# Patient Record
Sex: MALE | Race: WHITE | NOT HISPANIC OR LATINO | Employment: OTHER | URBAN - METROPOLITAN AREA
[De-identification: names, ages, dates, MRNs, and addresses within clinical notes are randomized per-mention and may not be internally consistent; named-entity substitution may affect disease eponyms.]

---

## 2023-08-26 DIAGNOSIS — E78.5 HYPERLIPIDEMIA, UNSPECIFIED HYPERLIPIDEMIA TYPE: ICD-10-CM

## 2023-08-28 RX ORDER — ROSUVASTATIN CALCIUM 40 MG/1
40 TABLET, COATED ORAL DAILY
Qty: 30 TABLET | Refills: 0 | Status: SHIPPED | OUTPATIENT
Start: 2023-08-28 | End: 2023-09-19 | Stop reason: SDUPTHER

## 2023-09-19 ENCOUNTER — OFFICE VISIT (OUTPATIENT)
Dept: PRIMARY CARE | Facility: CLINIC | Age: 68
End: 2023-09-19
Payer: MEDICARE

## 2023-09-19 VITALS
DIASTOLIC BLOOD PRESSURE: 80 MMHG | WEIGHT: 177 LBS | HEART RATE: 70 BPM | SYSTOLIC BLOOD PRESSURE: 126 MMHG | BODY MASS INDEX: 25.4 KG/M2

## 2023-09-19 DIAGNOSIS — Z23 NEED FOR INFLUENZA VACCINATION: ICD-10-CM

## 2023-09-19 DIAGNOSIS — E78.5 HYPERLIPIDEMIA LDL GOAL <100: ICD-10-CM

## 2023-09-19 DIAGNOSIS — N13.8 HYPERTROPHY OF PROSTATE WITH URINARY OBSTRUCTION: ICD-10-CM

## 2023-09-19 DIAGNOSIS — N40.1 HYPERTROPHY OF PROSTATE WITH URINARY OBSTRUCTION: ICD-10-CM

## 2023-09-19 DIAGNOSIS — Z00.00 MEDICARE ANNUAL WELLNESS VISIT, SUBSEQUENT: Primary | ICD-10-CM

## 2023-09-19 DIAGNOSIS — Z00.00 HEALTHCARE MAINTENANCE: ICD-10-CM

## 2023-09-19 DIAGNOSIS — Z12.5 PROSTATE CANCER SCREENING: ICD-10-CM

## 2023-09-19 DIAGNOSIS — I10 BENIGN ESSENTIAL HYPERTENSION: ICD-10-CM

## 2023-09-19 PROBLEM — M25.511 RIGHT ANTERIOR SHOULDER PAIN: Status: ACTIVE | Noted: 2023-09-19

## 2023-09-19 PROBLEM — R94.4 DECREASED GFR: Status: ACTIVE | Noted: 2023-09-19

## 2023-09-19 PROBLEM — M79.89 LEFT LEG SWELLING: Status: ACTIVE | Noted: 2023-09-19

## 2023-09-19 PROBLEM — M79.89 LEFT LEG SWELLING: Status: RESOLVED | Noted: 2023-09-19 | Resolved: 2023-09-19

## 2023-09-19 PROBLEM — M19.141 POST-TRAUMATIC OSTEOARTHRITIS OF RIGHT HAND: Status: ACTIVE | Noted: 2023-09-19

## 2023-09-19 PROBLEM — M21.40 FLAT FOOT: Status: ACTIVE | Noted: 2023-09-19

## 2023-09-19 PROBLEM — M25.562 CHRONIC PAIN OF LEFT KNEE: Status: ACTIVE | Noted: 2023-09-19

## 2023-09-19 PROBLEM — G89.29 CHRONIC PAIN OF LEFT KNEE: Status: ACTIVE | Noted: 2023-09-19

## 2023-09-19 PROBLEM — R55 SYNCOPE: Status: ACTIVE | Noted: 2023-09-19

## 2023-09-19 PROBLEM — M21.40 FLAT FOOT: Status: RESOLVED | Noted: 2023-09-19 | Resolved: 2023-09-19

## 2023-09-19 PROBLEM — I25.10 CORONARY ARTERY CALCIFICATION SEEN ON CT SCAN: Status: ACTIVE | Noted: 2023-09-19

## 2023-09-19 PROBLEM — R55 SYNCOPE: Status: RESOLVED | Noted: 2023-09-19 | Resolved: 2023-09-19

## 2023-09-19 PROBLEM — M75.121 COMPLETE TEAR OF RIGHT ROTATOR CUFF: Status: ACTIVE | Noted: 2023-09-19

## 2023-09-19 PROBLEM — I25.10 ATHEROSCLEROTIC HEART DISEASE OF NATIVE CORONARY ARTERY WITHOUT ANGINA PECTORIS: Status: ACTIVE | Noted: 2023-09-19

## 2023-09-19 PROBLEM — R00.1 SINUS BRADYCARDIA: Status: ACTIVE | Noted: 2023-09-19

## 2023-09-19 PROBLEM — M17.12 PRIMARY OSTEOARTHRITIS OF LEFT KNEE: Status: ACTIVE | Noted: 2023-09-19

## 2023-09-19 PROCEDURE — G0439 PPPS, SUBSEQ VISIT: HCPCS | Performed by: FAMILY MEDICINE

## 2023-09-19 PROCEDURE — 99214 OFFICE O/P EST MOD 30 MIN: CPT | Performed by: FAMILY MEDICINE

## 2023-09-19 PROCEDURE — 1170F FXNL STATUS ASSESSED: CPT | Performed by: FAMILY MEDICINE

## 2023-09-19 PROCEDURE — 3079F DIAST BP 80-89 MM HG: CPT | Performed by: FAMILY MEDICINE

## 2023-09-19 PROCEDURE — 1159F MED LIST DOCD IN RCRD: CPT | Performed by: FAMILY MEDICINE

## 2023-09-19 PROCEDURE — 1160F RVW MEDS BY RX/DR IN RCRD: CPT | Performed by: FAMILY MEDICINE

## 2023-09-19 PROCEDURE — 99397 PER PM REEVAL EST PAT 65+ YR: CPT | Performed by: FAMILY MEDICINE

## 2023-09-19 PROCEDURE — 3074F SYST BP LT 130 MM HG: CPT | Performed by: FAMILY MEDICINE

## 2023-09-19 PROCEDURE — G0008 ADMIN INFLUENZA VIRUS VAC: HCPCS | Performed by: FAMILY MEDICINE

## 2023-09-19 PROCEDURE — 90662 IIV NO PRSV INCREASED AG IM: CPT | Performed by: FAMILY MEDICINE

## 2023-09-19 PROCEDURE — 1036F TOBACCO NON-USER: CPT | Performed by: FAMILY MEDICINE

## 2023-09-19 RX ORDER — CELECOXIB 200 MG/1
CAPSULE ORAL
COMMUNITY
Start: 2020-07-10 | End: 2023-09-19 | Stop reason: ALTCHOICE

## 2023-09-19 RX ORDER — OLMESARTAN MEDOXOMIL 5 MG/1
1 TABLET ORAL DAILY
COMMUNITY
Start: 2020-12-21 | End: 2023-09-19 | Stop reason: SINTOL

## 2023-09-19 RX ORDER — TAMSULOSIN HYDROCHLORIDE 0.4 MG/1
0.8 CAPSULE ORAL DAILY
Qty: 180 CAPSULE | Refills: 1 | Status: SHIPPED | OUTPATIENT
Start: 2023-09-19 | End: 2024-03-14

## 2023-09-19 RX ORDER — ROSUVASTATIN CALCIUM 40 MG/1
40 TABLET, COATED ORAL DAILY
Qty: 90 TABLET | Refills: 1 | Status: SHIPPED | OUTPATIENT
Start: 2023-09-19 | End: 2024-03-07 | Stop reason: SDUPTHER

## 2023-09-19 RX ORDER — TAMSULOSIN HYDROCHLORIDE 0.4 MG/1
0.8 CAPSULE ORAL DAILY
COMMUNITY
End: 2023-09-19 | Stop reason: SDUPTHER

## 2023-09-19 ASSESSMENT — ACTIVITIES OF DAILY LIVING (ADL)
TAKING_MEDICATION: INDEPENDENT
DRESSING: INDEPENDENT
DOING_HOUSEWORK: INDEPENDENT
MANAGING_FINANCES: INDEPENDENT
GROCERY_SHOPPING: INDEPENDENT
BATHING: INDEPENDENT

## 2023-09-19 ASSESSMENT — PATIENT HEALTH QUESTIONNAIRE - PHQ9
1. LITTLE INTEREST OR PLEASURE IN DOING THINGS: NOT AT ALL
2. FEELING DOWN, DEPRESSED OR HOPELESS: NOT AT ALL
SUM OF ALL RESPONSES TO PHQ9 QUESTIONS 1 AND 2: 0

## 2023-09-19 ASSESSMENT — ENCOUNTER SYMPTOMS
OCCASIONAL FEELINGS OF UNSTEADINESS: 0
DEPRESSION: 0
LOSS OF SENSATION IN FEET: 0

## 2023-09-19 NOTE — ASSESSMENT & PLAN NOTE
All questions were answered and you were counseled on immunization(s) in detail and vaccination was provided

## 2023-09-19 NOTE — PROGRESS NOTES
Subjective   Reason for Visit: Brock Chatman is an 68 y.o. male here for a Medicare Annual Wellness Visit Subsequent, Hyperlipidemia, and Annual Exam.     Past Medical, Surgical, and Family History reviewed and updated in chart.    Reviewed all medications by prescribing practitioner or clinical pharmacist (such as prescriptions, OTCs, herbal therapies and supplements) and documented in the medical record.    HPI  1.  Medicare wellness/physical exam.  Colonoscopy 2017 with 10-year clearance  Vaccines up-to-date:  Tdap 2017  Shingrix 2020  PPSV23 2022  Would like influenza today    2.  Hyperlipidemia.  Taking Rosuvastatin 40 mg as prescribed   Tolerating well without side effects   Last lipid panel (Feb 2023) was well controlled   Requesting refill today     3.  BPH.  Taking tamsulosin 0.4 mg twice daily. Tolerating well.  States this continues to work well for symptoms, notices a difference in urinary symptoms if he forgets to take a dose   Denies any significant nocturia, not wanting to consider urology consult right now for any type of procedure    4.  Hypertension.  Previously taking olmesartan 20 mg daily but discontinued by cardiology after having dizziness symptoms and after a good stress echo  BP today was 126/80    Review of Systems  All pertinent positive symptoms are included in the history of present illness.    All other systems have been reviewed and are negative and noncontributory to this patient's current ailments.    Current Outpatient Medications   Medication Instructions    rosuvastatin (CRESTOR) 40 mg, oral, Daily    tamsulosin (FLOMAX) 0.8 mg, oral, Daily     Allergies   Allergen Reactions    Ace Inhibitors Cough     Immunization History   Administered Date(s) Administered    Flu vaccine (IIV4), preservative free *Check age/dose* 10/16/2016, 11/10/2018, 10/03/2020    Flu vaccine, quadrivalent, high-dose, preservative free, age 65y+ (FLUZONE) 11/02/2021, 10/05/2022, 09/19/2023    Influenza,  injectable, MDCK, preservative free, quadrivalent 10/13/2019    Influenza, seasonal, injectable 10/26/2013, 10/12/2014    Influenza, seasonal, injectable, preservative free 10/17/2015, 11/06/2017    Pfizer COVID-19 vaccine, bivalent, age 12 years and older (30 mcg/0.3 mL) 11/11/2022    Pfizer Purple Cap SARS-CoV-2 04/24/2021, 05/15/2021, 01/06/2022    Pneumococcal polysaccharide vaccine, 23-valent, age 2 years and older (PNEUMOVAX 23) 06/20/2022    Tdap vaccine, age 7 year and older (BOOSTRIX) 03/01/2017    Zoster vaccine, recombinant, adult (SHINGRIX) 12/28/2019, 07/12/2020     Past Surgical History:   Procedure Laterality Date    COLONOSCOPY  08/28/2014    Complete Colonoscopy    HEMORRHOID SURGERY  08/27/2014    Hemorrhoidectomy    HERNIA REPAIR  08/28/2014    Hernia Repair    OTHER SURGICAL HISTORY  08/28/2014    Wrist Surgery    ROTATOR CUFF REPAIR  08/28/2014    Rotator Cuff Repair    TONSILLECTOMY  08/27/2014    Tonsillectomy     No family history on file.    Social History     Tobacco Use    Smoking status: Never    Smokeless tobacco: Never     Patient Self Assessment of Health Status  Patient Self Assessment: Good    Nutrition and Exercise  Current Diet: Well Balanced Diet  Adequate Fluid Intake: Yes  Caffeine: Yes  Exercise Frequency: Infrequently    Functional Ability/Level of Safety  Cognitive Impairment Observed: No cognitive impairment observed  Cognitive Impairment Reported: No cognitive impairment reported by patient or family    Home Safety Risk Factors: None    Objective   Visit Vitals  /80   Pulse 70   Wt 80.3 kg (177 lb)   BMI 25.40 kg/m²   Smoking Status Never   BSA 1.99 m²      Physical Exam  CONSTITUTIONAL - well nourished, well developed, looks like stated age, in no acute distress, not ill-appearing, and not tired appearing  SKIN - normal skin color and pigmentation, normal skin turgor without rash, lesions, or nodules visualized  HEAD - no trauma, normocephalic  EYES - normal external  exam  CHEST - clear to auscultation, no wheezing, no crackles and no rales, good effort  CARDIAC - bradycardic rate and regular rhythm, no skipped beats, no murmur  EXTREMITIES - no edema, no deformities  NEUROLOGICAL - normal balance, normal motor, no ataxia  PSYCHIATRIC - alert, pleasant and cordial, age-appropriate     Assessment/Plan   Problem List Items Addressed This Visit       Benign essential hypertension    Current Assessment & Plan     I would like to have you monitor and record blood pressures at home   Blood pressure goal should be below 130/80, ideally 120/80  If the blood pressure is too high, we need to consider restarting antihypertensive therapy         Relevant Orders    Comprehensive Metabolic Panel    Hyperlipidemia LDL goal <100    Current Assessment & Plan     Please obtain fasting blood work at your earliest convenience  Continue current medication without change         Relevant Medications    rosuvastatin (Crestor) 40 mg tablet    Other Relevant Orders    Lipid Panel    Hypertrophy of prostate with urinary obstruction    Current Assessment & Plan     Stable, no changes to medication recommended  If symptoms worsen, notify me so that I can get urology consult to discuss possible procedure         Relevant Medications    tamsulosin (Flomax) 0.4 mg 24 hr capsule    Healthcare maintenance    Current Assessment & Plan     Complete history and physical examination was performed  We will notify of test results once available         Need for influenza vaccination    Current Assessment & Plan     All questions were answered and you were counseled on immunization(s) in detail and vaccination was provided         Relevant Orders    Flu vaccine, quadrivalent, high-dose, preservative free, age 65y+ (FLUZONE) (Completed)     Other Visit Diagnoses       Medicare annual wellness visit, subsequent    -  Primary    Questions asked and reviewed    Prostate cancer screening        Relevant Orders    Prostate  Specific Antigen, Screen          Current Outpatient Medications   Medication Instructions    rosuvastatin (CRESTOR) 40 mg, oral, Daily    tamsulosin (FLOMAX) 0.8 mg, oral, Daily     Patient Care Team:  Brandon Ramirez DO as PCP - General (Family Medicine)  Brandon Ramirez DO as PCP - [employer] PCP

## 2023-09-19 NOTE — ASSESSMENT & PLAN NOTE
Complete history and physical examination was performed  We will notify of test results once available

## 2023-09-19 NOTE — ASSESSMENT & PLAN NOTE
Stable, no changes to medication recommended  If symptoms worsen, notify me so that I can get urology consult to discuss possible procedure

## 2023-09-19 NOTE — ASSESSMENT & PLAN NOTE
I would like to have you monitor and record blood pressures at home   Blood pressure goal should be below 130/80, ideally 120/80  If the blood pressure is too high, we need to consider restarting antihypertensive therapy

## 2023-09-19 NOTE — ASSESSMENT & PLAN NOTE
Please obtain fasting blood work at your earliest convenience  Continue current medication without change

## 2023-09-20 ENCOUNTER — LAB (OUTPATIENT)
Dept: LAB | Facility: LAB | Age: 68
End: 2023-09-20
Payer: MEDICARE

## 2023-09-20 DIAGNOSIS — Z12.5 PROSTATE CANCER SCREENING: ICD-10-CM

## 2023-09-20 DIAGNOSIS — E78.5 HYPERLIPIDEMIA LDL GOAL <100: ICD-10-CM

## 2023-09-20 DIAGNOSIS — I10 BENIGN ESSENTIAL HYPERTENSION: ICD-10-CM

## 2023-09-20 LAB
ALANINE AMINOTRANSFERASE (SGPT) (U/L) IN SER/PLAS: 13 U/L (ref 10–52)
ALBUMIN (G/DL) IN SER/PLAS: 3.9 G/DL (ref 3.4–5)
ALKALINE PHOSPHATASE (U/L) IN SER/PLAS: 68 U/L (ref 33–136)
ANION GAP IN SER/PLAS: 8 MMOL/L (ref 10–20)
ASPARTATE AMINOTRANSFERASE (SGOT) (U/L) IN SER/PLAS: 15 U/L (ref 9–39)
BILIRUBIN TOTAL (MG/DL) IN SER/PLAS: 0.6 MG/DL (ref 0–1.2)
CALCIUM (MG/DL) IN SER/PLAS: 8.8 MG/DL (ref 8.6–10.3)
CARBON DIOXIDE, TOTAL (MMOL/L) IN SER/PLAS: 30 MMOL/L (ref 21–32)
CHLORIDE (MMOL/L) IN SER/PLAS: 109 MMOL/L (ref 98–107)
CHOLESTEROL (MG/DL) IN SER/PLAS: 195 MG/DL (ref 0–199)
CHOLESTEROL IN HDL (MG/DL) IN SER/PLAS: 50.9 MG/DL
CHOLESTEROL/HDL RATIO: 3.8
CREATININE (MG/DL) IN SER/PLAS: 1.05 MG/DL (ref 0.5–1.3)
GFR MALE: 78 ML/MIN/1.73M2
GLUCOSE (MG/DL) IN SER/PLAS: 91 MG/DL (ref 74–99)
LDL: 126 MG/DL (ref 0–99)
POTASSIUM (MMOL/L) IN SER/PLAS: 4.7 MMOL/L (ref 3.5–5.3)
PROSTATE SPECIFIC ANTIGEN,SCREEN: 3.76 NG/ML (ref 0–4)
PROTEIN TOTAL: 5.9 G/DL (ref 6.4–8.2)
SODIUM (MMOL/L) IN SER/PLAS: 142 MMOL/L (ref 136–145)
TRIGLYCERIDE (MG/DL) IN SER/PLAS: 91 MG/DL (ref 0–149)
UREA NITROGEN (MG/DL) IN SER/PLAS: 13 MG/DL (ref 6–23)
VLDL: 18 MG/DL (ref 0–40)

## 2023-09-20 PROCEDURE — 36415 COLL VENOUS BLD VENIPUNCTURE: CPT

## 2023-09-20 PROCEDURE — 80061 LIPID PANEL: CPT

## 2023-09-20 PROCEDURE — G0103 PSA SCREENING: HCPCS

## 2023-09-20 PROCEDURE — 80053 COMPREHEN METABOLIC PANEL: CPT

## 2023-09-22 DIAGNOSIS — R97.20 INCREASED PROSTATE SPECIFIC ANTIGEN (PSA) VELOCITY: Primary | ICD-10-CM

## 2023-09-22 NOTE — RESULT ENCOUNTER NOTE
Cholesterol 195, 50, 126, 91 previously 148, 65, 66, 81.  Not sure why the bad cholesterol went up by 60 points, but possibly noncompliance?  At any rate, we need to continue rosuvastatin 40 mg daily  Sugar, kidney, liver, normal  Prostate cancer blood test is at 3.76 previous 2.71 so it has increased by 1.05 in the past year which can potentially be indicative of pathology such as prostate cancer.  I would like to have you repeat the PSA in about 2 months and if it continues to increase then urology consult would be warranted.  If not, then it is likely secondary to prostate enlargement

## 2024-03-05 ENCOUNTER — HOSPITAL ENCOUNTER (OUTPATIENT)
Dept: RADIOLOGY | Facility: CLINIC | Age: 69
Discharge: HOME | End: 2024-03-05
Payer: MEDICARE

## 2024-03-05 ENCOUNTER — OFFICE VISIT (OUTPATIENT)
Dept: PRIMARY CARE | Facility: CLINIC | Age: 69
End: 2024-03-05
Payer: MEDICARE

## 2024-03-05 VITALS
DIASTOLIC BLOOD PRESSURE: 80 MMHG | SYSTOLIC BLOOD PRESSURE: 136 MMHG | HEART RATE: 76 BPM | HEIGHT: 70 IN | BODY MASS INDEX: 26.2 KG/M2 | WEIGHT: 183 LBS

## 2024-03-05 DIAGNOSIS — E78.5 HYPERLIPIDEMIA LDL GOAL <100: Primary | ICD-10-CM

## 2024-03-05 DIAGNOSIS — M62.830 LUMBAR PARASPINAL MUSCLE SPASM: ICD-10-CM

## 2024-03-05 DIAGNOSIS — R97.20 INCREASED PROSTATE SPECIFIC ANTIGEN (PSA) VELOCITY: ICD-10-CM

## 2024-03-05 DIAGNOSIS — G89.29 CHRONIC RIGHT-SIDED LOW BACK PAIN WITHOUT SCIATICA: ICD-10-CM

## 2024-03-05 DIAGNOSIS — M54.50 CHRONIC RIGHT-SIDED LOW BACK PAIN WITHOUT SCIATICA: ICD-10-CM

## 2024-03-05 DIAGNOSIS — M54.59 LUMBAR TRIGGER POINT SYNDROME: ICD-10-CM

## 2024-03-05 DIAGNOSIS — I10 BENIGN ESSENTIAL HYPERTENSION: ICD-10-CM

## 2024-03-05 PROCEDURE — 1160F RVW MEDS BY RX/DR IN RCRD: CPT | Performed by: FAMILY MEDICINE

## 2024-03-05 PROCEDURE — 72110 X-RAY EXAM L-2 SPINE 4/>VWS: CPT | Performed by: RADIOLOGY

## 2024-03-05 PROCEDURE — 20553 NJX 1/MLT TRIGGER POINTS 3/>: CPT | Performed by: FAMILY MEDICINE

## 2024-03-05 PROCEDURE — 3075F SYST BP GE 130 - 139MM HG: CPT | Performed by: FAMILY MEDICINE

## 2024-03-05 PROCEDURE — 1123F ACP DISCUSS/DSCN MKR DOCD: CPT | Performed by: FAMILY MEDICINE

## 2024-03-05 PROCEDURE — 1158F ADVNC CARE PLAN TLK DOCD: CPT | Performed by: FAMILY MEDICINE

## 2024-03-05 PROCEDURE — 1159F MED LIST DOCD IN RCRD: CPT | Performed by: FAMILY MEDICINE

## 2024-03-05 PROCEDURE — 72110 X-RAY EXAM L-2 SPINE 4/>VWS: CPT

## 2024-03-05 PROCEDURE — 99214 OFFICE O/P EST MOD 30 MIN: CPT | Performed by: FAMILY MEDICINE

## 2024-03-05 PROCEDURE — 1036F TOBACCO NON-USER: CPT | Performed by: FAMILY MEDICINE

## 2024-03-05 PROCEDURE — 3079F DIAST BP 80-89 MM HG: CPT | Performed by: FAMILY MEDICINE

## 2024-03-05 RX ORDER — TRIAMCINOLONE ACETONIDE 40 MG/ML
40 INJECTION, SUSPENSION INTRA-ARTICULAR; INTRAMUSCULAR ONCE
Status: COMPLETED | OUTPATIENT
Start: 2024-03-05 | End: 2024-03-05

## 2024-03-05 RX ADMIN — TRIAMCINOLONE ACETONIDE 40 MG: 40 INJECTION, SUSPENSION INTRA-ARTICULAR; INTRAMUSCULAR at 12:45

## 2024-03-05 ASSESSMENT — PATIENT HEALTH QUESTIONNAIRE - PHQ9
2. FEELING DOWN, DEPRESSED OR HOPELESS: NOT AT ALL
SUM OF ALL RESPONSES TO PHQ9 QUESTIONS 1 AND 2: 0
1. LITTLE INTEREST OR PLEASURE IN DOING THINGS: NOT AT ALL

## 2024-03-05 NOTE — PROGRESS NOTES
Subjective   Patient ID: Brock Chatman is a 68 y.o. male who presents for Back Pain (lower).    Past Medical, Surgical, and Family History reviewed and updated in chart.    Reviewed all medications by prescribing practitioner or clinical pharmacist (such as prescriptions, OTCs, herbal therapies and supplements) and documented in the medical record.    HPI  1.  Right low back pain.  Srinivasa is experiencing severe right-sided low back pain, a chronic issue for him. He reports extreme difficulty in turning in bed due to the intensification of pain with twisting movements. He denies any known injury or trauma that could have caused this pain. Importantly, he does not report any radiation of the pain into the lower extremity, nor any bowel or bladder dysfunction. The pain appears to be highly localized to the right lower lumbar area.    2.  Hyperlipidemia.  Taking Rosuvastatin 40 mg as prescribed   Not fasting for blood work today, but willing to have done tomorrow    3.  PSA elevation.  Last blood work done in September 2023, PSA 3.76, the year prior it was 2.71  We had suggested repeating the PSA in 2 to 3 months, but that has yet to be done  Willing to have done with his blood work tomorrow    4.  Hypertension.  Previously taking olmesartan 20 mg daily but discontinued by cardiology after having dizziness symptoms and after a good stress echo  BP today was 136/80    Review of Systems  All pertinent positive symptoms are included in the history of present illness.    All other systems have been reviewed and are negative and noncontributory to this patient's current ailments.    Past Medical History:   Diagnosis Date    Allergic contact dermatitis due to plants, except food 09/10/2020    Poison ivy dermatitis    Cellulitis of left lower limb 09/10/2020    Cellulitis of left lower extremity     Past Surgical History:   Procedure Laterality Date    COLONOSCOPY  08/28/2014    Complete Colonoscopy    HEMORRHOID SURGERY   "08/27/2014    Hemorrhoidectomy    HERNIA REPAIR  08/28/2014    Hernia Repair    OTHER SURGICAL HISTORY  08/28/2014    Wrist Surgery    ROTATOR CUFF REPAIR  08/28/2014    Rotator Cuff Repair    TONSILLECTOMY  08/27/2014    Tonsillectomy     Social History     Tobacco Use    Smoking status: Never    Smokeless tobacco: Never     No family history on file.  Immunization History   Administered Date(s) Administered    Flu vaccine (IIV4), preservative free *Check age/dose* 10/16/2016, 11/10/2018, 10/03/2020    Flu vaccine, quadrivalent, high-dose, preservative free, age 65y+ (FLUZONE) 11/02/2021, 10/05/2022, 09/19/2023    Flu vaccine, quadrivalent, no egg protein, age 6 month or greater (FLUCELVAX) 10/13/2019    Influenza, seasonal, injectable 10/26/2013, 10/12/2014    Influenza, seasonal, injectable, preservative free 10/17/2015, 11/06/2017    Pfizer COVID-19 vaccine, Fall 2023, 12 years and older, (30mcg/0.3mL) 12/21/2023    Pfizer COVID-19 vaccine, bivalent, age 12 years and older (30 mcg/0.3 mL) 11/11/2022    Pfizer Purple Cap SARS-CoV-2 04/24/2021, 05/15/2021, 01/06/2022    Pneumococcal polysaccharide vaccine, 23-valent, age 2 years and older (PNEUMOVAX 23) 06/20/2022    Tdap vaccine, age 7 year and older (BOOSTRIX, ADACEL) 03/01/2017    Zoster vaccine, recombinant, adult (SHINGRIX) 12/28/2019, 07/12/2020     Current Outpatient Medications   Medication Instructions    rosuvastatin (CRESTOR) 40 mg, oral, Daily    tamsulosin (FLOMAX) 0.8 mg, oral, Daily     Allergies   Allergen Reactions    Ace Inhibitors Cough       Objective   Vitals:    03/05/24 1055   BP: 136/80   Pulse: 76   Weight: 83 kg (183 lb)   Height: 1.778 m (5' 10\")     Body mass index is 26.26 kg/m².    BP Readings from Last 3 Encounters:   03/05/24 136/80   09/19/23 126/80   02/14/23 138/72      Wt Readings from Last 3 Encounters:   03/05/24 83 kg (183 lb)   09/19/23 80.3 kg (177 lb)   02/14/23 84.4 kg (186 lb)        No visits with results within 1 " Month(s) from this visit.   Latest known visit with results is:   Lab on 09/20/2023   Component Date Value    Glucose 09/20/2023 91     Sodium 09/20/2023 142     Potassium 09/20/2023 4.7     Chloride 09/20/2023 109 (H)     Bicarbonate 09/20/2023 30     Anion Gap 09/20/2023 8 (L)     Urea Nitrogen 09/20/2023 13     Creatinine 09/20/2023 1.05     GFR MALE 09/20/2023 78     Calcium 09/20/2023 8.8     Albumin 09/20/2023 3.9     Alkaline Phosphatase 09/20/2023 68     Total Protein 09/20/2023 5.9 (L)     AST 09/20/2023 15     Total Bilirubin 09/20/2023 0.6     ALT (SGPT) 09/20/2023 13     Cholesterol 09/20/2023 195     HDL 09/20/2023 50.9     Cholesterol/HDL Ratio 09/20/2023 3.8     LDL 09/20/2023 126 (H)     VLDL 09/20/2023 18     Triglycerides 09/20/2023 91     Prostate Specific Antige* 09/20/2023 3.76      Physical Exam  CONSTITUTIONAL - well nourished, well developed, looks like stated age, in no acute distress, not ill-appearing, and not tired appearing  SKIN - normal skin color and pigmentation, normal skin turgor without rash, lesions, or nodules visualized  HEAD - no trauma, normocephalic  EYES - pupils are equal and reactive to light, extraocular muscles are intact, and normal external exam  EXTREMITIES - no obvious or evident edema, no obvious or evident deformities; right lower lumbar area at L2, 3, 4, 5 with significant paraspinal muscle tenderness, spasms, positive red reflex, no spinous process tenderness  NEUROLOGICAL - normal gait, normal balance, normal motor, no ataxia, alert, oriented and no focal signs  PSYCHIATRIC - alert, pleasant and cordial, age-appropriate    Procedure  Risks, benefits, and options of the trigger point injection(s) were discussed: 1% lidocaine (2 mL) and Kenalog 40 mg injected at 5 separate sites on right with 0.6 mL at each injection site into the muscle spasm/myositis as noted on the physical examination; successful without complication and tolerated extremely  well    Assessment/Plan   Problem List Items Addressed This Visit       Benign essential hypertension     Stable, still not taking medication for this concern         Relevant Orders    Comprehensive Metabolic Panel    Lipid Panel    Hyperlipidemia LDL goal <100 - Primary     Please obtain fasting blood work to ensure proper dosing of your medication         Relevant Orders    Comprehensive Metabolic Panel    Lipid Panel    Increased prostate specific antigen (PSA) velocity     Please obtain PSA with blood work tomorrow so that we can determine if urology consult is warranted         Chronic right-sided low back pain without sciatica     Suspect some arthritic changes, x-ray recommended         Relevant Orders    XR lumbar spine complete 4+ views    Lumbar trigger point syndrome     Trigger point injections were provided today hoping for both acute and some long-term relief  If there is no significant improvement we may have to consider a muscle relaxant medication versus a Medrol Dosepak along with formal PT         Relevant Medications    triamcinolone acetonide (Kenalog-40) injection 40 mg (Completed)    Lumbar paraspinal muscle spasm     After reviewing x-ray, we may have to consider physical therapy based on the spasms that you are experiencing         Relevant Medications    triamcinolone acetonide (Kenalog-40) injection 40 mg (Completed)

## 2024-03-05 NOTE — ASSESSMENT & PLAN NOTE
After reviewing x-ray, we may have to consider physical therapy based on the spasms that you are experiencing

## 2024-03-05 NOTE — ASSESSMENT & PLAN NOTE
Trigger point injections were provided today hoping for both acute and some long-term relief  If there is no significant improvement we may have to consider a muscle relaxant medication versus a Medrol Dosepak along with formal PT

## 2024-03-05 NOTE — ASSESSMENT & PLAN NOTE
Please obtain PSA with blood work tomorrow so that we can determine if urology consult is warranted   Patient

## 2024-03-06 ENCOUNTER — LAB (OUTPATIENT)
Dept: LAB | Facility: LAB | Age: 69
End: 2024-03-06
Payer: MEDICARE

## 2024-03-06 DIAGNOSIS — E78.5 HYPERLIPIDEMIA LDL GOAL <100: ICD-10-CM

## 2024-03-06 DIAGNOSIS — R97.20 INCREASED PROSTATE SPECIFIC ANTIGEN (PSA) VELOCITY: ICD-10-CM

## 2024-03-06 DIAGNOSIS — I10 BENIGN ESSENTIAL HYPERTENSION: ICD-10-CM

## 2024-03-06 LAB
ALBUMIN SERPL BCP-MCNC: 4.4 G/DL (ref 3.4–5)
ALP SERPL-CCNC: 71 U/L (ref 33–136)
ALT SERPL W P-5'-P-CCNC: 18 U/L (ref 10–52)
ANION GAP SERPL CALC-SCNC: 9 MMOL/L (ref 10–20)
AST SERPL W P-5'-P-CCNC: 20 U/L (ref 9–39)
BILIRUB SERPL-MCNC: 0.6 MG/DL (ref 0–1.2)
BUN SERPL-MCNC: 15 MG/DL (ref 6–23)
CALCIUM SERPL-MCNC: 9.5 MG/DL (ref 8.6–10.3)
CHLORIDE SERPL-SCNC: 110 MMOL/L (ref 98–107)
CHOLEST SERPL-MCNC: 150 MG/DL (ref 0–199)
CHOLESTEROL/HDL RATIO: 2.1
CO2 SERPL-SCNC: 28 MMOL/L (ref 21–32)
CREAT SERPL-MCNC: 0.97 MG/DL (ref 0.5–1.3)
EGFRCR SERPLBLD CKD-EPI 2021: 86 ML/MIN/1.73M*2
GLUCOSE SERPL-MCNC: 70 MG/DL (ref 74–99)
HDLC SERPL-MCNC: 72.2 MG/DL
LDLC SERPL CALC-MCNC: 63 MG/DL
NON HDL CHOLESTEROL: 78 MG/DL (ref 0–149)
POTASSIUM SERPL-SCNC: 4.4 MMOL/L (ref 3.5–5.3)
PROT SERPL-MCNC: 7 G/DL (ref 6.4–8.2)
PSA SERPL-MCNC: 4.01 NG/ML
SODIUM SERPL-SCNC: 143 MMOL/L (ref 136–145)
TRIGL SERPL-MCNC: 73 MG/DL (ref 0–149)
VLDL: 15 MG/DL (ref 0–40)

## 2024-03-06 PROCEDURE — 80053 COMPREHEN METABOLIC PANEL: CPT

## 2024-03-06 PROCEDURE — 80061 LIPID PANEL: CPT

## 2024-03-06 PROCEDURE — 36415 COLL VENOUS BLD VENIPUNCTURE: CPT

## 2024-03-06 PROCEDURE — 84153 ASSAY OF PSA TOTAL: CPT

## 2024-03-07 DIAGNOSIS — E78.5 HYPERLIPIDEMIA LDL GOAL <100: ICD-10-CM

## 2024-03-07 DIAGNOSIS — R97.20 INCREASED PROSTATE SPECIFIC ANTIGEN (PSA) VELOCITY: Primary | ICD-10-CM

## 2024-03-07 RX ORDER — ROSUVASTATIN CALCIUM 40 MG/1
40 TABLET, COATED ORAL DAILY
Qty: 90 TABLET | Refills: 1 | Status: SHIPPED | OUTPATIENT
Start: 2024-03-07 | End: 2024-09-03

## 2024-03-07 NOTE — RESULT ENCOUNTER NOTE
Your sugar, kidney, liver, and electrolyte levels are all in excellent condition.     Your recent prostate cancer screening test has shown a significant increase in your PSA level. It has risen to 4.01 from the previous 2.17. An increase like this over the past year is something we need to monitor closely, as it could potentially indicate the presence of prostate cancer.    Please don't be alarmed. PSA levels can fluctuate for numerous reasons, and a rise doesn't necessarily mean you have cancer. However, to be on the safe side, I strongly recommend having another PSA test in 2 months. If the PSA level remains elevated or increases further, we will consult a urologist to investigate the cause of this elevation.    On a positive note, your cholesterol levels are excellent across the board. Therefore, I see no need to make any changes to your current medication regimen.

## 2024-03-07 NOTE — RESULT ENCOUNTER NOTE
The x-ray shows some mild arthritic changes, nothing significant that would alert me to a disc herniation as the joint spaces are relatively stable, so I suspect the pain that you are experiencing is muscular.  If there is no improvement with the shots that were provided the other day, let us know so that I can send you over an oral medication and give that a try

## 2024-03-11 ENCOUNTER — TELEPHONE (OUTPATIENT)
Dept: PRIMARY CARE | Facility: CLINIC | Age: 69
End: 2024-03-11
Payer: MEDICARE

## 2024-03-11 DIAGNOSIS — M62.830 LUMBAR PARASPINAL MUSCLE SPASM: Primary | ICD-10-CM

## 2024-03-11 RX ORDER — PREDNISONE 10 MG/1
TABLET ORAL
Qty: 30 TABLET | Refills: 0 | Status: SHIPPED | OUTPATIENT
Start: 2024-03-11 | End: 2024-03-22

## 2024-03-11 NOTE — TELEPHONE ENCOUNTER
Pt states the injection is not working and he is still in pain  Wants the medication you had recommended at his visit last week

## 2024-03-14 DIAGNOSIS — N40.1 HYPERTROPHY OF PROSTATE WITH URINARY OBSTRUCTION: ICD-10-CM

## 2024-03-14 DIAGNOSIS — N13.8 HYPERTROPHY OF PROSTATE WITH URINARY OBSTRUCTION: ICD-10-CM

## 2024-03-14 RX ORDER — TAMSULOSIN HYDROCHLORIDE 0.4 MG/1
0.8 CAPSULE ORAL DAILY
Qty: 180 CAPSULE | Refills: 1 | Status: SHIPPED | OUTPATIENT
Start: 2024-03-14

## 2024-09-04 NOTE — PROGRESS NOTES
Subjective   Patient ID: Brock Chatman is a 69 y.o. male who presents for Medicare Annual Wellness Visit Subsequent, Annual Exam, Hyperlipidemia, and Hypertension.    Past Medical, Surgical, and Family History reviewed and updated in chart.    Reviewed all medications by prescribing practitioner or clinical pharmacist (such as prescriptions, OTCs, herbal therapies and supplements) and documented in the medical record.    HPI  1. Medicare Wellness/Physical Exam  Srinivasa's last colonoscopy was in 2017, with a ten-year clearance. His immunizations are up to date; however, he needs a Tdap, which was deferred today.     Overall, he is doing well. Brock recently recovered from COVID a week ago and is doing much better. He will return in October to receive his flu vaccination.    2. Hyperlipidemia  His last lipid panel, completed six months ago, showed the following results: Cholesterol 150, LDL 62, and Triglycerides 73. He is currently on Crestor 40 mg daily. A CT coronary calcium score from 2018 was notable for a total score of 229 in the LAD.    3. Left Shoulder Pain  Earlier this year, Brock injured his left shoulder on a tree while mowing the grass. Since then, he has been experiencing muscular pain in his left shoulder, with some radiculopathy to the neck and left flank area. He has a history of bilateral rotator cuff repairs, with the left rotator cuff repair performed in 2009 by Dr. Garcia.    4. Hypertension  His blood pressure has been excellently controlled, and therefore the antihypertensive medication has been discontinued. We will repeat the CMP at this time.    5. Elevated PSA  PSA from six months ago was 4.01. We will repeat the test at this time.    Review of Systems  All pertinent positive symptoms are included in the history of present illness.    All other systems have been reviewed and are negative and noncontributory to this patient's current ailments.    Past Medical History:   Diagnosis Date     Allergic contact dermatitis due to plants, except food 09/10/2020    Poison ivy dermatitis    Cellulitis of left lower limb 09/10/2020    Cellulitis of left lower extremity     Past Surgical History:   Procedure Laterality Date    COLONOSCOPY  08/28/2014    Complete Colonoscopy    HEMORRHOID SURGERY  08/27/2014    Hemorrhoidectomy    HERNIA REPAIR  08/28/2014    Hernia Repair    OTHER SURGICAL HISTORY  08/28/2014    Wrist Surgery    ROTATOR CUFF REPAIR  08/28/2014    Rotator Cuff Repair    TONSILLECTOMY  08/27/2014    Tonsillectomy     Social History     Tobacco Use    Smoking status: Never    Smokeless tobacco: Never     No family history on file.  Immunization History   Administered Date(s) Administered    Flu vaccine (IIV4), preservative free *Check age/dose* 10/16/2016, 11/10/2018, 10/03/2020    Flu vaccine, quadrivalent, high-dose, preservative free, age 65y+ (FLUZONE) 11/02/2021, 10/05/2022, 09/19/2023    Flu vaccine, quadrivalent, no egg protein, age 6 month or greater (FLUCELVAX) 10/13/2019    Flu vaccine, trivalent, preservative free, age 6 months and greater (Fluarix/Fluzone/Flulaval) 10/17/2015, 11/06/2017    Influenza, seasonal, injectable 10/26/2013, 10/12/2014    Pfizer COVID-19 vaccine, Fall 2023, 12 years and older, (30mcg/0.3mL) 12/21/2023    Pfizer COVID-19 vaccine, bivalent, age 12 years and older (30 mcg/0.3 mL) 11/11/2022    Pfizer Purple Cap SARS-CoV-2 04/24/2021, 05/15/2021, 01/06/2022    Pneumococcal polysaccharide vaccine, 23-valent, age 2 years and older (PNEUMOVAX 23) 06/20/2022    Tdap vaccine, age 7 year and older (BOOSTRIX, ADACEL) 03/01/2017    Zoster vaccine, recombinant, adult (SHINGRIX) 12/28/2019, 07/12/2020     Current Outpatient Medications   Medication Instructions    meloxicam (MOBIC) 15 mg, oral, Daily    rosuvastatin (CRESTOR) 40 mg, oral, Daily    tamsulosin (FLOMAX) 0.8 mg, oral, Daily     Allergies   Allergen Reactions    Ace Inhibitors Cough       Objective   Vitals:     "09/06/24 0925   BP: 110/74   Pulse: 80   Weight: 82.1 kg (181 lb)   Height: 1.549 m (5' 1\")     Body mass index is 34.2 kg/m².    BP Readings from Last 3 Encounters:   09/06/24 110/74   03/05/24 136/80   09/19/23 126/80      Wt Readings from Last 3 Encounters:   09/06/24 82.1 kg (181 lb)   03/05/24 83 kg (183 lb)   09/19/23 80.3 kg (177 lb)        No visits with results within 1 Month(s) from this visit.   Latest known visit with results is:   Lab on 03/06/2024   Component Date Value    Prostate Specific AG 03/06/2024 4.01 (H)     Glucose 03/06/2024 70 (L)     Sodium 03/06/2024 143     Potassium 03/06/2024 4.4     Chloride 03/06/2024 110 (H)     Bicarbonate 03/06/2024 28     Anion Gap 03/06/2024 9 (L)     Urea Nitrogen 03/06/2024 15     Creatinine 03/06/2024 0.97     eGFR 03/06/2024 86     Calcium 03/06/2024 9.5     Albumin 03/06/2024 4.4     Alkaline Phosphatase 03/06/2024 71     Total Protein 03/06/2024 7.0     AST 03/06/2024 20     Bilirubin, Total 03/06/2024 0.6     ALT 03/06/2024 18     Cholesterol 03/06/2024 150     HDL-Cholesterol 03/06/2024 72.2     Cholesterol/HDL Ratio 03/06/2024 2.1     LDL Calculated 03/06/2024 63     VLDL 03/06/2024 15     Triglycerides 03/06/2024 73     Non HDL Cholesterol 03/06/2024 78      Physical Exam  CONSTITUTIONAL - well nourished, well developed, looks like stated age, in no acute distress, not ill-appearing, and not tired appearing  SKIN - normal skin color and pigmentation, normal skin turgor without rash, lesions, or nodules visualized  HEAD - no trauma, normocephalic  EYES - pupils are equal and reactive to light, extraocular muscles are intact, and normal external exam  ENT - TM's intact, no injection, no signs of infection, uvula midline, normal tongue movement and throat normal, no exudate, nasal passage without discharge and patent  NECK - supple without rigidity, no neck mass was observed, no thyromegaly or thyroid nodules  CHEST - clear to auscultation, no wheezing, " no crackles and no rales, good effort  CARDIAC - regular rate and regular rhythm, no skipped beats, no murmur  ABDOMEN - no organomegaly, soft, nontender, nondistended, normal bowel sounds  EXTREMITIES - no obvious or evident edema, no obvious or evident deformities  NEUROLOGICAL - alert, oriented and no focal signs  PSYCHIATRIC - alert, pleasant and cordial, age-appropriate  IMMUNOLOGIC - no cervical lymphadenopathy    Assessment/Plan   Problem List Items Addressed This Visit       Benign essential hypertension     Blood pressure was excellent today!  We will repeat your complete metabolic panel at this time and notify you once the results are available.         Relevant Orders    Comprehensive Metabolic Panel    Hyperlipidemia LDL goal <100     We will repeat your lipid panel at this time and notify you once the results are available.  A refill of your medication has been sent to the pharmacy.           Relevant Medications    rosuvastatin (Crestor) 40 mg tablet    Other Relevant Orders    Comprehensive Metabolic Panel    Lipid Panel    Increased prostate specific antigen (PSA) velocity     PSA was elevated on previous blood work but not to a significant degree.  We will repeat your PSA at this time.  If it continues to be elevated we will refer you to a urologist for further evaluation.         Relevant Orders    PSA    Chronic left shoulder pain     I recommended that you follow up with Dr. Calderon, who performed your left rotator cuff repair in 2009. I understand that you declined a referral as you are not interested in any surgical interventions at this time. Instead, you would prefer to trial meloxicam, which I am okay with prescribing at this juncture. A prescription for the medication will be sent to the pharmacy. If you would like an orthopedic referral in the future, please do not hesitate to reach out to our office.         Relevant Medications    meloxicam (Mobic) 15 mg tablet    Medicare annual  wellness visit, subsequent - Primary     Questions answered and reviewed  Colonoscopy up-to-date  Immunizations up to date, suggested Tdap at local pharmacy         Physical exam, annual     Complete history and physical examination was performed  We will notify of test results once available

## 2024-09-06 ENCOUNTER — LAB (OUTPATIENT)
Dept: LAB | Facility: LAB | Age: 69
End: 2024-09-06
Payer: MEDICARE

## 2024-09-06 ENCOUNTER — APPOINTMENT (OUTPATIENT)
Dept: PRIMARY CARE | Facility: CLINIC | Age: 69
End: 2024-09-06
Payer: MEDICARE

## 2024-09-06 VITALS
HEART RATE: 80 BPM | DIASTOLIC BLOOD PRESSURE: 74 MMHG | SYSTOLIC BLOOD PRESSURE: 110 MMHG | HEIGHT: 61 IN | BODY MASS INDEX: 34.17 KG/M2 | WEIGHT: 181 LBS

## 2024-09-06 DIAGNOSIS — Z00.00 MEDICARE ANNUAL WELLNESS VISIT, SUBSEQUENT: Primary | ICD-10-CM

## 2024-09-06 DIAGNOSIS — R97.20 INCREASED PROSTATE SPECIFIC ANTIGEN (PSA) VELOCITY: ICD-10-CM

## 2024-09-06 DIAGNOSIS — M25.512 CHRONIC LEFT SHOULDER PAIN: ICD-10-CM

## 2024-09-06 DIAGNOSIS — G89.29 CHRONIC LEFT SHOULDER PAIN: ICD-10-CM

## 2024-09-06 DIAGNOSIS — E78.5 HYPERLIPIDEMIA LDL GOAL <100: ICD-10-CM

## 2024-09-06 DIAGNOSIS — I10 BENIGN ESSENTIAL HYPERTENSION: ICD-10-CM

## 2024-09-06 DIAGNOSIS — Z00.00 PHYSICAL EXAM, ANNUAL: ICD-10-CM

## 2024-09-06 PROBLEM — Z23 NEED FOR INFLUENZA VACCINATION: Status: RESOLVED | Noted: 2023-09-19 | Resolved: 2024-09-06

## 2024-09-06 LAB
ALBUMIN SERPL BCP-MCNC: 4.2 G/DL (ref 3.4–5)
ALP SERPL-CCNC: 67 U/L (ref 33–136)
ALT SERPL W P-5'-P-CCNC: 15 U/L (ref 10–52)
ANION GAP SERPL CALC-SCNC: 11 MMOL/L (ref 10–20)
AST SERPL W P-5'-P-CCNC: 20 U/L (ref 9–39)
BILIRUB SERPL-MCNC: 0.5 MG/DL (ref 0–1.2)
BUN SERPL-MCNC: 20 MG/DL (ref 6–23)
CALCIUM SERPL-MCNC: 9.3 MG/DL (ref 8.6–10.3)
CHLORIDE SERPL-SCNC: 107 MMOL/L (ref 98–107)
CHOLEST SERPL-MCNC: 132 MG/DL (ref 0–199)
CHOLESTEROL/HDL RATIO: 2.5
CO2 SERPL-SCNC: 27 MMOL/L (ref 21–32)
CREAT SERPL-MCNC: 1.2 MG/DL (ref 0.5–1.3)
EGFRCR SERPLBLD CKD-EPI 2021: 66 ML/MIN/1.73M*2
GLUCOSE SERPL-MCNC: 89 MG/DL (ref 74–99)
HDLC SERPL-MCNC: 52 MG/DL
LDLC SERPL CALC-MCNC: 68 MG/DL
NON HDL CHOLESTEROL: 80 MG/DL (ref 0–149)
POTASSIUM SERPL-SCNC: 4.8 MMOL/L (ref 3.5–5.3)
PROT SERPL-MCNC: 7 G/DL (ref 6.4–8.2)
PSA SERPL-MCNC: 3.77 NG/ML
SODIUM SERPL-SCNC: 140 MMOL/L (ref 136–145)
TRIGL SERPL-MCNC: 61 MG/DL (ref 0–149)
VLDL: 12 MG/DL (ref 0–40)

## 2024-09-06 PROCEDURE — 84153 ASSAY OF PSA TOTAL: CPT

## 2024-09-06 PROCEDURE — 36415 COLL VENOUS BLD VENIPUNCTURE: CPT

## 2024-09-06 PROCEDURE — 80053 COMPREHEN METABOLIC PANEL: CPT

## 2024-09-06 PROCEDURE — 80061 LIPID PANEL: CPT

## 2024-09-06 RX ORDER — ROSUVASTATIN CALCIUM 40 MG/1
40 TABLET, COATED ORAL DAILY
Qty: 90 TABLET | Refills: 1 | Status: SHIPPED | OUTPATIENT
Start: 2024-09-06 | End: 2025-03-05

## 2024-09-06 RX ORDER — ROSUVASTATIN CALCIUM 40 MG/1
40 TABLET, COATED ORAL DAILY
Qty: 90 TABLET | Refills: 1 | OUTPATIENT
Start: 2024-09-06

## 2024-09-06 RX ORDER — MELOXICAM 15 MG/1
15 TABLET ORAL DAILY
Qty: 30 TABLET | Refills: 0 | Status: SHIPPED | OUTPATIENT
Start: 2024-09-06 | End: 2024-10-06

## 2024-09-06 ASSESSMENT — ACTIVITIES OF DAILY LIVING (ADL)
MANAGING_FINANCES: INDEPENDENT
DRESSING: INDEPENDENT
BATHING: INDEPENDENT
GROCERY_SHOPPING: INDEPENDENT
TAKING_MEDICATION: INDEPENDENT
DOING_HOUSEWORK: INDEPENDENT

## 2024-09-06 ASSESSMENT — PATIENT HEALTH QUESTIONNAIRE - PHQ9
1. LITTLE INTEREST OR PLEASURE IN DOING THINGS: NOT AT ALL
SUM OF ALL RESPONSES TO PHQ9 QUESTIONS 1 AND 2: 0
2. FEELING DOWN, DEPRESSED OR HOPELESS: NOT AT ALL

## 2024-09-06 ASSESSMENT — ENCOUNTER SYMPTOMS
DEPRESSION: 0
LOSS OF SENSATION IN FEET: 0
OCCASIONAL FEELINGS OF UNSTEADINESS: 0

## 2024-09-06 NOTE — ASSESSMENT & PLAN NOTE
I recommended that you follow up with Dr. Calderon, who performed your left rotator cuff repair in 2009. I understand that you declined a referral as you are not interested in any surgical interventions at this time. Instead, you would prefer to trial meloxicam, which I am okay with prescribing at this juncture. A prescription for the medication will be sent to the pharmacy. If you would like an orthopedic referral in the future, please do not hesitate to reach out to our office.

## 2024-09-06 NOTE — ASSESSMENT & PLAN NOTE
Questions answered and reviewed  Colonoscopy up-to-date  Immunizations up to date, suggested Tdap at local pharmacy

## 2024-09-06 NOTE — ASSESSMENT & PLAN NOTE
PSA was elevated on previous blood work but not to a significant degree.  We will repeat your PSA at this time.  If it continues to be elevated we will refer you to a urologist for further evaluation.

## 2024-09-06 NOTE — ASSESSMENT & PLAN NOTE
Blood pressure was excellent today!  We will repeat your complete metabolic panel at this time and notify you once the results are available.

## 2024-09-06 NOTE — ASSESSMENT & PLAN NOTE
We will repeat your lipid panel at this time and notify you once the results are available.  A refill of your medication has been sent to the pharmacy.

## 2024-09-08 NOTE — RESULT ENCOUNTER NOTE
PSA looks better at 3.77, 6 months ago it was 4.01 so my suspicion is that the PSA has climbed because of an enlarged prostate.  If you would like to be seen by urology to confirm this, I would be more than happy to set up a referral for you    Sugar, kidney, liver, electrolytes normal    Cholesterol excellent at 132 with LDL 68.  I sent over rosuvastatin to the pharmacy on file until March 5, 2025

## 2024-09-09 DIAGNOSIS — N13.8 HYPERTROPHY OF PROSTATE WITH URINARY OBSTRUCTION: ICD-10-CM

## 2024-09-09 DIAGNOSIS — N40.1 HYPERTROPHY OF PROSTATE WITH URINARY OBSTRUCTION: ICD-10-CM

## 2024-09-09 DIAGNOSIS — R97.20 INCREASED PROSTATE SPECIFIC ANTIGEN (PSA) VELOCITY: Primary | ICD-10-CM

## 2024-09-15 DIAGNOSIS — N13.8 HYPERTROPHY OF PROSTATE WITH URINARY OBSTRUCTION: ICD-10-CM

## 2024-09-15 DIAGNOSIS — N40.1 HYPERTROPHY OF PROSTATE WITH URINARY OBSTRUCTION: ICD-10-CM

## 2024-09-16 RX ORDER — TAMSULOSIN HYDROCHLORIDE 0.4 MG/1
0.8 CAPSULE ORAL DAILY
Qty: 180 CAPSULE | Refills: 1 | Status: SHIPPED | OUTPATIENT
Start: 2024-09-16

## 2024-09-18 ENCOUNTER — APPOINTMENT (OUTPATIENT)
Dept: UROLOGY | Facility: CLINIC | Age: 69
End: 2024-09-18
Payer: MEDICARE

## 2024-09-18 VITALS — TEMPERATURE: 97 F

## 2024-09-18 DIAGNOSIS — N40.1 HYPERTROPHY OF PROSTATE WITH URINARY OBSTRUCTION: ICD-10-CM

## 2024-09-18 DIAGNOSIS — R97.20 INCREASED PROSTATE SPECIFIC ANTIGEN (PSA) VELOCITY: Primary | ICD-10-CM

## 2024-09-18 DIAGNOSIS — N13.8 HYPERTROPHY OF PROSTATE WITH URINARY OBSTRUCTION: ICD-10-CM

## 2024-09-18 DIAGNOSIS — R97.20 ELEVATED PSA: ICD-10-CM

## 2024-09-18 LAB
POC APPEARANCE, URINE: CLEAR
POC BILIRUBIN, URINE: NEGATIVE
POC BLOOD, URINE: NEGATIVE
POC COLOR, URINE: YELLOW
POC GLUCOSE, URINE: NEGATIVE MG/DL
POC KETONES, URINE: NEGATIVE MG/DL
POC LEUKOCYTES, URINE: NEGATIVE
POC NITRITE,URINE: NEGATIVE
POC PH, URINE: 5.5 PH
POC PROTEIN, URINE: NEGATIVE MG/DL
POC SPECIFIC GRAVITY, URINE: 1.01
POC UROBILINOGEN, URINE: 0.2 EU/DL

## 2024-09-18 PROCEDURE — 99204 OFFICE O/P NEW MOD 45 MIN: CPT | Performed by: UROLOGY

## 2024-09-18 PROCEDURE — 1126F AMNT PAIN NOTED NONE PRSNT: CPT | Performed by: UROLOGY

## 2024-09-18 PROCEDURE — 1123F ACP DISCUSS/DSCN MKR DOCD: CPT | Performed by: UROLOGY

## 2024-09-18 PROCEDURE — 1159F MED LIST DOCD IN RCRD: CPT | Performed by: UROLOGY

## 2024-09-18 PROCEDURE — 51798 US URINE CAPACITY MEASURE: CPT | Performed by: UROLOGY

## 2024-09-18 PROCEDURE — 81003 URINALYSIS AUTO W/O SCOPE: CPT | Performed by: UROLOGY

## 2024-09-18 PROCEDURE — 51741 ELECTRO-UROFLOWMETRY FIRST: CPT | Performed by: UROLOGY

## 2024-09-18 ASSESSMENT — PAIN SCALES - GENERAL: PAINLEVEL: 0-NO PAIN

## 2024-09-18 NOTE — PROGRESS NOTES
Subjective   Brock Chatman is a 69 y.o. male presenting as a new patient today, kindly referred by Dr. Brandon Ramirez due to elevated PSA. Patient's PSA is 3.77 (9/6/2024), his PSA was 4.01 (3/6/2024). He has complaints of bothersome LUTS despite taking Tamsulosin 0.8mg. He has weak urinary stream and nocturia x1. Patient is interested in an outlet procedure. Denies any recent gross hematuria, fevers, chills, urinary retention, intractable flank or abdominal pain, nausea or vomiting.      Past Medical History:   Diagnosis Date    Allergic contact dermatitis due to plants, except food 09/10/2020    Poison ivy dermatitis    Cellulitis of left lower limb 09/10/2020    Cellulitis of left lower extremity     Past Surgical History:   Procedure Laterality Date    COLONOSCOPY  08/28/2014    Complete Colonoscopy    HEMORRHOID SURGERY  08/27/2014    Hemorrhoidectomy    HERNIA REPAIR  08/28/2014    Hernia Repair    OTHER SURGICAL HISTORY  08/28/2014    Wrist Surgery    ROTATOR CUFF REPAIR  08/28/2014    Rotator Cuff Repair    TONSILLECTOMY  08/27/2014    Tonsillectomy     No family history on file.  Current Outpatient Medications   Medication Sig Dispense Refill    meloxicam (Mobic) 15 mg tablet Take 1 tablet (15 mg) by mouth once daily. 30 tablet 0    rosuvastatin (Crestor) 40 mg tablet Take 1 tablet (40 mg) by mouth once daily. 90 tablet 1    tamsulosin (Flomax) 0.4 mg 24 hr capsule TAKE 2 CAPSULES BY MOUTH EVERY  capsule 1     No current facility-administered medications for this visit.     Allergies   Allergen Reactions    Ace Inhibitors Cough     Social History     Socioeconomic History    Marital status:      Spouse name: Not on file    Number of children: Not on file    Years of education: Not on file    Highest education level: Not on file   Occupational History    Not on file   Tobacco Use    Smoking status: Never    Smokeless tobacco: Never   Substance and Sexual Activity    Alcohol use: Not on file     Drug use: Not on file    Sexual activity: Not on file   Other Topics Concern    Not on file   Social History Narrative    Not on file     Social Determinants of Health     Financial Resource Strain: Not on file   Food Insecurity: Not on file   Transportation Needs: Not on file   Physical Activity: Not on file   Stress: Not on file   Social Connections: Not on file   Intimate Partner Violence: Not on file   Housing Stability: Not on file       Review of Systems  Pertinent items are noted in HPI.    Objective       Lab Review  Lab Results   Component Value Date    WBC 3.7 (L) 06/20/2022    RBC 4.32 (L) 06/20/2022    HGB 13.3 (L) 06/20/2022    HCT 40.7 (L) 06/20/2022     06/20/2022      Lab Results   Component Value Date    BUN 20 09/06/2024    CREATININE 1.20 09/06/2024      Lab Results   Component Value Date    PSA 3.77 09/06/2024   IPSS 5 and 3  PVR 12ml  Uroflow study demonstrated voided volume of 98 and maximal flow rate of 7.7 ml/s.     Urine analysis shows negative     Assessment/Plan   Diagnoses and all orders for this visit:  Increased prostate specific antigen (PSA) velocity  -     Referral to Urology  -     Measure post void residual  -     POCT UA Automated manually resulted  -     MR prostate with jay boundaries if pirads 3 or above; Future  Hypertrophy of prostate with urinary obstruction  -     Referral to Urology  -     Measure post void residual  Elevated PSA  -     MR prostate with jay boundaries if pirads 3 or above; Future    Elevated PSA     We will obtain a prostate MRI to assess prostate anatomy to r/o prostate cancer.     Follow up virtually to review.     BPH with LUTS     Patient is interested in an outlet procedure.     We will address his LUTS once we obtain the MRI.     Refill Tamsulosin 0.8mg.     All questions were answered to the patient's satisfaction. Patient agrees with the plan and wishes to proceed. Follow-up will be scheduled appropriately.     E&M visit today is associated  with current or anticipated ongoing medical care services related to a patient's single, serious condition or a complex condition.    Scribed for Dr. Cortez by Elif Rolle. I , Dr Cortez, have personally reviewed and agreed with the information entered by the Virtual Scribe.

## 2024-10-02 ENCOUNTER — HOSPITAL ENCOUNTER (OUTPATIENT)
Dept: RADIOLOGY | Facility: HOSPITAL | Age: 69
Discharge: HOME | End: 2024-10-02
Payer: MEDICARE

## 2024-10-02 DIAGNOSIS — R97.20 ELEVATED PSA: ICD-10-CM

## 2024-10-02 DIAGNOSIS — R97.20 INCREASED PROSTATE SPECIFIC ANTIGEN (PSA) VELOCITY: ICD-10-CM

## 2024-10-02 PROCEDURE — 72197 MRI PELVIS W/O & W/DYE: CPT

## 2024-10-02 PROCEDURE — 2550000001 HC RX 255 CONTRASTS: Performed by: UROLOGY

## 2024-10-02 PROCEDURE — A9575 INJ GADOTERATE MEGLUMI 0.1ML: HCPCS | Performed by: UROLOGY

## 2024-10-02 PROCEDURE — 72197 MRI PELVIS W/O & W/DYE: CPT | Performed by: RADIOLOGY

## 2024-10-02 RX ORDER — GADOTERATE MEGLUMINE 376.9 MG/ML
0.2 INJECTION INTRAVENOUS
Status: DISCONTINUED | OUTPATIENT
Start: 2024-10-02 | End: 2024-10-02 | Stop reason: ENTERED-IN-ERROR

## 2024-10-02 RX ORDER — GADOTERATE MEGLUMINE 376.9 MG/ML
0.2 INJECTION INTRAVENOUS
Status: COMPLETED | OUTPATIENT
Start: 2024-10-02 | End: 2024-10-02

## 2024-10-22 ENCOUNTER — APPOINTMENT (OUTPATIENT)
Dept: UROLOGY | Facility: CLINIC | Age: 69
End: 2024-10-22
Payer: MEDICARE

## 2024-10-22 DIAGNOSIS — N13.8 HYPERTROPHY OF PROSTATE WITH URINARY OBSTRUCTION: Primary | ICD-10-CM

## 2024-10-22 DIAGNOSIS — N40.1 HYPERTROPHY OF PROSTATE WITH URINARY OBSTRUCTION: Primary | ICD-10-CM

## 2024-10-22 DIAGNOSIS — Z79.2 NEED FOR PROPHYLACTIC ANTIBIOTIC: ICD-10-CM

## 2024-10-22 PROCEDURE — 1123F ACP DISCUSS/DSCN MKR DOCD: CPT | Performed by: UROLOGY

## 2024-10-22 PROCEDURE — G2211 COMPLEX E/M VISIT ADD ON: HCPCS | Performed by: UROLOGY

## 2024-10-22 PROCEDURE — 99214 OFFICE O/P EST MOD 30 MIN: CPT | Performed by: UROLOGY

## 2024-10-22 RX ORDER — CEPHALEXIN 500 MG/1
500 CAPSULE ORAL ONCE
Qty: 1 CAPSULE | Refills: 0 | Status: SHIPPED | OUTPATIENT
Start: 2024-10-22 | End: 2024-10-22

## 2024-12-03 ENCOUNTER — APPOINTMENT (OUTPATIENT)
Dept: UROLOGY | Facility: CLINIC | Age: 69
End: 2024-12-03
Payer: MEDICARE

## 2024-12-03 VITALS — HEIGHT: 70 IN | BODY MASS INDEX: 27.23 KG/M2 | WEIGHT: 190.2 LBS | TEMPERATURE: 97.2 F

## 2024-12-03 DIAGNOSIS — N13.8 HYPERTROPHY OF PROSTATE WITH URINARY OBSTRUCTION: Primary | ICD-10-CM

## 2024-12-03 DIAGNOSIS — N33 BLADDER DISORDERS IN DISEASES CLASSIFIED ELSEWHERE: ICD-10-CM

## 2024-12-03 DIAGNOSIS — N40.1 HYPERTROPHY OF PROSTATE WITH URINARY OBSTRUCTION: Primary | ICD-10-CM

## 2024-12-03 DIAGNOSIS — Z01.818 PREOP TESTING: ICD-10-CM

## 2024-12-03 LAB
POC APPEARANCE, URINE: CLEAR
POC BILIRUBIN, URINE: NEGATIVE
POC BLOOD, URINE: NEGATIVE
POC COLOR, URINE: YELLOW
POC GLUCOSE, URINE: NEGATIVE MG/DL
POC KETONES, URINE: NEGATIVE MG/DL
POC LEUKOCYTES, URINE: NEGATIVE
POC NITRITE,URINE: NEGATIVE
POC PH, URINE: 5.5 PH
POC PROTEIN, URINE: NEGATIVE MG/DL
POC SPECIFIC GRAVITY, URINE: 1.02
POC UROBILINOGEN, URINE: 0.2 EU/DL

## 2024-12-03 PROCEDURE — 81003 URINALYSIS AUTO W/O SCOPE: CPT | Performed by: UROLOGY

## 2024-12-03 PROCEDURE — 52000 CYSTOURETHROSCOPY: CPT | Performed by: UROLOGY

## 2024-12-03 PROCEDURE — 99215 OFFICE O/P EST HI 40 MIN: CPT | Performed by: UROLOGY

## 2024-12-03 RX ORDER — CEFAZOLIN SODIUM 2 G/100ML
2 INJECTION, SOLUTION INTRAVENOUS ONCE
OUTPATIENT
Start: 2024-12-03 | End: 2024-12-03

## 2024-12-03 ASSESSMENT — PAIN SCALES - GENERAL: PAINLEVEL_OUTOF10: 0-NO PAIN

## 2024-12-03 NOTE — PROGRESS NOTES
Subjective     This visit was completed via telemedicine. All issues as below were discussed and addressed but no physical exam was performed unless allowed by visual confirmation. If it was felt that the patient should be evaluated in clinic, then they were directed there. Patient verbally consented to visit.      Brock Chatman is a 69 y.o. male with elevated PSA and BPH with LUTS on Tamsulosin 0.8mg, presenting today for cystoscopy in anticipation of an outlet procedure.     Prostate MRI from 10/2/2024 which showed a 100.9g prostate with no evidence of cancer.      To review (9/18/2024):  Brock Chatman is a 69 y.o. male presenting as a new patient today, kindly referred by Dr. Brandon Ramirez due to elevated PSA. Patient's PSA is 3.77 (9/6/2024), his PSA was 4.01 (3/6/2024). He has complaints of bothersome LUTS despite taking Tamsulosin 0.8mg. He has weak urinary stream and nocturia x1. Patient is interested in an outlet procedure. Denies any recent gross hematuria, fevers, chills, urinary retention, intractable flank or abdominal pain, nausea or vomiting.     Past Medical History:   Diagnosis Date    Allergic contact dermatitis due to plants, except food 09/10/2020    Poison ivy dermatitis    Cellulitis of left lower limb 09/10/2020    Cellulitis of left lower extremity     Past Surgical History:   Procedure Laterality Date    COLONOSCOPY  08/28/2014    Complete Colonoscopy    HEMORRHOID SURGERY  08/27/2014    Hemorrhoidectomy    HERNIA REPAIR  08/28/2014    Hernia Repair    OTHER SURGICAL HISTORY  08/28/2014    Wrist Surgery    ROTATOR CUFF REPAIR  08/28/2014    Rotator Cuff Repair    TONSILLECTOMY  08/27/2014    Tonsillectomy     No family history on file.  Current Outpatient Medications   Medication Sig Dispense Refill    rosuvastatin (Crestor) 40 mg tablet Take 1 tablet (40 mg) by mouth once daily. 90 tablet 1    tamsulosin (Flomax) 0.4 mg 24 hr capsule TAKE 2 CAPSULES BY MOUTH EVERY  capsule 1     No  current facility-administered medications for this visit.     Allergies   Allergen Reactions    Ace Inhibitors Cough     Social History     Socioeconomic History    Marital status:      Spouse name: Not on file    Number of children: Not on file    Years of education: Not on file    Highest education level: Not on file   Occupational History    Not on file   Tobacco Use    Smoking status: Never    Smokeless tobacco: Never   Substance and Sexual Activity    Alcohol use: Not on file    Drug use: Not on file    Sexual activity: Not on file   Other Topics Concern    Not on file   Social History Narrative    Not on file     Social Drivers of Health     Financial Resource Strain: Not on file   Food Insecurity: Not on file   Transportation Needs: Not on file   Physical Activity: Not on file   Stress: Not on file   Social Connections: Not on file   Intimate Partner Violence: Not on file   Housing Stability: Not on file       Review of Systems  Pertinent items are noted in HPI.    Objective     Cystoscopy performed:   Brock Chatman identified using two (2) forms of identification.  Procedure: diagnostic cystourethroscopy  Indications for procedure: BPH with LUTS   Risks, benefits, and alternatives were discussed in detail.   Patient appears to understand and agrees to proceed.   Patient has signed the procedure consent form.     Cystoscopy findings:  Urethra: normal course and caliber, no evidence of stricture or lesion.  Prostate: non-obstructing.   Bladder: trabeculated bladder with several diverticulii, no stone, tumors or other lesions.  Post-cystoscopy: Patient tolerated procedure without complications.    [x] Lateral hypertrophy, with complete channel occlusion.  [x] Obstructing median lobe with intravesical protrusion.  [x] Good sphincter coaptation.  [] Normal bladder.       Lab Review  Lab Results   Component Value Date    WBC 3.7 (L) 06/20/2022    RBC 4.32 (L) 06/20/2022    HGB 13.3 (L) 06/20/2022    HCT  40.7 (L) 06/20/2022     06/20/2022      Lab Results   Component Value Date    BUN 20 09/06/2024    CREATININE 1.20 09/06/2024      Lab Results   Component Value Date    PSA 3.77 09/06/2024           Assessment/Plan   Diagnoses and all orders for this visit:  Hypertrophy of prostate with urinary obstruction  -     POCT UA Automated manually resulted  -     Case Request Operating Room: Enucleation Transurethral Prostate; Standing  -     Basic Metabolic Panel; Future  -     CBC; Future  -     Protime-INR; Future  -     Urine Culture; Future  Preop testing  -     Basic Metabolic Panel; Future  -     CBC; Future  -     Protime-INR; Future  -     Urine Culture; Future  -     Request for Pre-Admission Testing Visit; Future  Bladder disorders in diseases classified elsewhere  -     Protime-INR; Future  Other orders  -     Place in outpatient/hospital ambulatory surgery; Standing  -     Full code; Standing  -     NPO Diet Except: Sips with meds; Effective now; Standing  -     Height and weight; Standing  -     Insert and maintain peripheral IV; Standing  -     Saline lock IV; Standing  -     Inpatient consult to Respiratory Care; Standing  -     ceFAZolin (Ancef) 2 g in dextrose (iso)  mL      Elevated PSA   BPH with LUTS (100.9g)      Given the large volume of the prostate, I recommended proceeding with HoLEP. I discussed that a laser will be used to shell out the obstructing tissue from the inside of the prostate gland. I discussed in detail the risks associated with the HoLEP procedure. As with any surgical procedure, HoLEP surgery has some risks. Such as, incontinence of urine which is common for a few months after surgery but is rarely permanent (about one percent of cases), bleeding after surgery, the need for transfusion or another operation due to bleeding, UTI, damage to the bladder, damage to the ureteral orifice (a small tube where kidney drains into the bladder), prolonged need for a catheter after  surgery, or scar tissue in the area of surgery or urethra. Retrograde ejaculation was discussed as a permanent irreversible side effect.        All questions were answered to the patient's satisfaction. Patient agrees with the plan and wishes to proceed. Follow-up will be scheduled appropriately.     E&M visit today is associated with current or anticipated ongoing medical care services related to a patient's single, serious condition or a complex condition.    I spent 40 minutes of dedicated E&M time, including preparation and review of records, notes, and data, time spent with patient/family, and documentation.     Scribed for Dr. Cortez by Elif Rolle. I , Dr Cortez, have personally reviewed and agreed with the information entered by the Virtual Scribe.

## 2025-01-16 ENCOUNTER — PRE-ADMISSION TESTING (OUTPATIENT)
Dept: PREADMISSION TESTING | Facility: HOSPITAL | Age: 70
End: 2025-01-16
Payer: MEDICARE

## 2025-01-16 VITALS
RESPIRATION RATE: 16 BRPM | HEART RATE: 70 BPM | HEIGHT: 70 IN | TEMPERATURE: 98.8 F | OXYGEN SATURATION: 100 % | BODY MASS INDEX: 27.2 KG/M2 | WEIGHT: 190 LBS

## 2025-01-16 DIAGNOSIS — Z01.818 PREOP TESTING: ICD-10-CM

## 2025-01-16 LAB
ATRIAL RATE: 60 BPM
P AXIS: 51 DEGREES
P OFFSET: 196 MS
P ONSET: 136 MS
PR INTERVAL: 164 MS
Q ONSET: 218 MS
QRS COUNT: 10 BEATS
QRS DURATION: 96 MS
QT INTERVAL: 410 MS
QTC CALCULATION(BAZETT): 410 MS
QTC FREDERICIA: 410 MS
R AXIS: 80 DEGREES
T AXIS: 58 DEGREES
T OFFSET: 423 MS
VENTRICULAR RATE: 60 BPM

## 2025-01-16 PROCEDURE — 93010 ELECTROCARDIOGRAM REPORT: CPT | Performed by: INTERNAL MEDICINE

## 2025-01-16 PROCEDURE — 99204 OFFICE O/P NEW MOD 45 MIN: CPT | Performed by: NURSE PRACTITIONER

## 2025-01-16 PROCEDURE — 93005 ELECTROCARDIOGRAM TRACING: CPT

## 2025-01-16 RX ORDER — NAPROXEN 250 MG/1
250 TABLET ORAL
COMMUNITY

## 2025-01-16 ASSESSMENT — DUKE ACTIVITY SCORE INDEX (DASI)
TOTAL_SCORE: 32.2
CAN YOU CLIMB A FLIGHT OF STAIRS OR WALK UP A HILL: YES
CAN YOU RUN A SHORT DISTANCE: YES
CAN YOU DO MODERATE WORK AROUND THE HOUSE LIKE VACUUMING, SWEEPING FLOORS OR CARRYING GROCERIES: YES
CAN YOU TAKE CARE OF YOURSELF (EAT, DRESS, BATHE, OR USE TOILET): YES
CAN YOU DO YARD WORK LIKE RAKING LEAVES, WEEDING OR PUSHING A MOWER: NO
CAN YOU DO LIGHT WORK AROUND THE HOUSE LIKE DUSTING OR WASHING DISHES: YES
CAN YOU WALK INDOORS, SUCH AS AROUND YOUR HOUSE: YES
DASI METS SCORE: 6.7
CAN YOU PARTICIPATE IN MODERATE RECREATIONAL ACTIVITIES LIKE GOLF, BOWLING, DANCING, DOUBLES TENNIS OR THROWING A BASEBALL OR FOOTBALL: NO
CAN YOU WALK A BLOCK OR TWO ON LEVEL GROUND: YES
CAN YOU DO HEAVY WORK AROUND THE HOUSE LIKE SCRUBBING FLOORS OR LIFTING AND MOVING HEAVY FURNITURE: NO
CAN YOU PARTICIPATE IN STRENOUS SPORTS LIKE SWIMMING, SINGLES TENNIS, FOOTBALL, BASKETBALL, OR SKIING: NO
CAN YOU HAVE SEXUAL RELATIONS: YES

## 2025-01-16 ASSESSMENT — PAIN - FUNCTIONAL ASSESSMENT: PAIN_FUNCTIONAL_ASSESSMENT: 0-10

## 2025-01-16 NOTE — H&P (VIEW-ONLY)
"CPM/PAT Evaluation       Name: Brock Vaughan (Brock Vaughan)  /Age: 1955/69 y.o.     In-Person       Chief Complaint: Hypertrophy of prostate with urinary obstruction     HPI  Patient is an alert and oriented 69 year old male scheduled for a  HoLEP on 25 with Dr. Cortez for  Hypertrophy of prostate with urinary obstruction . PMHX includes HLD, HTN. [place] [facility] [place] perioperative risk stratification and optimization.       Past Medical History:   Diagnosis Date    Allergic contact dermatitis due to plants, except food 09/10/2020    Poison ivy dermatitis    Cellulitis of left lower limb 09/10/2020    Cellulitis of left lower extremity       Past Surgical History:   Procedure Laterality Date    COLONOSCOPY  2014    Complete Colonoscopy    HEMORRHOID SURGERY  2014    Hemorrhoidectomy    HERNIA REPAIR  2014    Hernia Repair    OTHER SURGICAL HISTORY  2014    Wrist Surgery    ROTATOR CUFF REPAIR  2014    Rotator Cuff Repair    TONSILLECTOMY  2014    Tonsillectomy       Patient  has no history on file for sexual activity.    Family History   Problem Relation Name Age of Onset    Heart disease Mother Micehlle vaughan     Heart disease Father Augusto vaughan        No Known Allergies    Prior to Admission medications    Medication Sig Start Date End Date Taking? Authorizing Provider   naproxen (Naprosyn) 250 mg tablet Take 1 tablet (250 mg) by mouth 2 times daily (morning and late afternoon).   Yes Historical Provider, MD   rosuvastatin (Crestor) 40 mg tablet Take 1 tablet (40 mg) by mouth once daily. 9/6/24 3/5/25 Yes Brandon Ramirez DO   tamsulosin (Flomax) 0.4 mg 24 hr capsule TAKE 2 CAPSULES BY MOUTH EVERY DAY 24  Yes Brandon Ramirez DO          Visit Vitals  Pulse 70   Temp 37.1 °C (98.8 °F) (Temporal)   Resp 16   Ht 1.778 m (5' 10\")   Wt 86.2 kg (190 lb)   SpO2 100%   BMI 27.26 kg/m²   Smoking Status Never   BSA 2.06 m²      Review of Systems "   Constitutional: Negative for chills, decreased appetite, diaphoresis, fever and malaise/fatigue.   Eyes:  Negative for blurred vision and double vision.   Cardiovascular:  Negative for chest pain, claudication, cyanosis, dyspnea on exertion, irregular heartbeat, leg swelling, near-syncope and palpitations.   Respiratory:  Negative for cough, hemoptysis, shortness of breath and wheezing.    Endocrine: Negative for cold intolerance, heat intolerance, polydipsia, polyphagia and polyuria.   Gastrointestinal:  Negative for abdominal pain, constipation, diarrhea, dysphagia, nausea and vomiting.   Genitourinary:  Negative for bladder incontinence, dysuria, hematuria, incomplete emptying, nocturia, frequency, pelvic pain and urgency.   Neurological:  Negative for headaches, light-headedness, paresthesias, sensory change and weakness.   Psychiatric/Behavioral:  Negative for altered mental status.    Musculoskeletal: Negative for myalgias, arthralgias     Vitals and nursing note reviewed.     Physical exam  Constitutional:       Appearance: Normal appearance. He is Overweight.   HENT:      Head: Normocephalic and atraumatic.      Mouth/Throat:      Mouth: Mucous membranes are moist.      Pharynx: Oropharynx is clear.   Eyes:      Extraocular Movements: Extraocular movements intact.      Conjunctiva/sclera: Conjunctivae normal.      Pupils: Pupils are equal, round, and reactive to light.   Cardiovascular:      PMI at left midclavicular line. Normal rate. Regular rhythm. Normal S1. Normal S2.       Murmurs: There is no murmur.      No gallop.  No click. No rub.       No audible carotid bruit     No lower extremity edema on exam  Pulmonary:      Effort: Pulmonary effort is normal.      Breath sounds: Normal breath sounds.   Abdominal:      General: Abdomen is flat. Bowel sounds are normal.      Palpations: Abdomen is soft and non-tender  Musculoskeletal:      Cervical back: Normal range of motion and neck supple.   Skin:      General: Skin is warm and dry.      Capillary Refill: Capillary refill takes less than 2 seconds.   Neurological:      General: No focal deficit present.      Mental Status: He is alert and oriented to person, place, and time. Mental status is at baseline.   Psychiatric:         Mood and Affect: Mood normal.         Behavior: Behavior normal.         Thought Content: Thought content normal.         Judgment: Judgment normal.     Vitals and nursing note reviewed. Physical exam within normal limits.   DASI Risk Score      Flowsheet Row Pre-Admission Testing [facility]/16/2025 in [facility]   Can you take care of yourself (eat, dress, bathe, or use toilet)?  2.75 filed at 01/16/2025 1405   Can you walk indoors, such as around your house? 1.75 filed at 01/16/2025 1405   Can you walk a block or two on level ground?  2.75 filed at 01/16/2025 1405   Can you climb a flight of stairs or walk up a hill? 5.5 filed at 01/16/2025 1405   Can you run a short distance? 8 filed at 01/16/2025 1405   Can you do light work around the house like dusting or washing dishes? 2.7 filed at 01/16/2025 1405   Can you do moderate work around the house like vacuuming, sweeping floors or carrying groceries? 3.5 filed at 01/16/2025 1405   Can you do heavy work around the house like scrubbing floors or lifting and moving heavy furniture?  0 filed at 01/16/2025 1405   Can you do yard work like raking leaves, weeding or pushing a mower? 0 filed at 01/16/2025 1405   Can you have sexual relations? 5.25 filed at 01/16/2025 1405   Can you participate in moderate recreational activities like golf, bowling, dancing, doubles tennis or throwing a baseball or football? 0 filed at 01/16/2025 1405   Can you participate in strenous sports like swimming, singles tennis, football, basketball, or skiing? 0 filed at 01/16/2025 1405   DASI SCORE 32.2 filed at 01/16/2025 1405   METS Score (Will be calculated only when all the questions are answered) 6.7  filed at 01/16/2025 1405          Caprini DVT Assessment      Flowsheet Row Pre-Admission Testing from 1/16/2025 in [facility]   DVT Score (IF A SCORE IS NOT CALCULATING, MUST SELECT A BMI TO COMPLETE) 10 filed at 01/16/2025 1403   Surgical Factors History of multiple trauma <1 month, Major surgery planned, including arthroscopic and laproscopic (1-2 hours) filed at 01/16/2025 1403   BMI (BMI MUST BE CHOSEN) 30 or less filed at 01/16/2025 1403          Modified Frailty Index    No data to display       CHADS2 Stroke Risk  Current as of 11 minutes ago        N/A 3 to 100%: High Risk   2 to < 3%: Medium Risk   0 to < 2%: Low Risk     Last Change: N/A          This score determines the patient's risk of having a stroke if the patient has atrial fibrillation.        This score is not applicable to this patient. Components are not calculated.          Revised Cardiac Risk Index      Flowsheet Row Pre-Admission Testing from 1/16/2025 in [facility]   High-Risk Surgery (Intraperitoneal, Intrathoracic,Suprainguinal vascular) 0 filed at 01/16/2025 1405   History of ischemic heart disease (History of MI, History of positive exercuse test, Current chest paint considered due to myocardial ischemia, Use of nitrate therapy, ECG with pathological Q Waves) 0 filed at 01/16/2025 1405   History of congestive heart failure (pulmonary edemia, bilateral rales or S3 gallop, Paroxysmal nocturnal dyspnea, CXR showing pulmonary vascular redistribution) 0 filed at 01/16/2025 1405   History of cerebrovascular disease (Prior TIA or stroke) 0 filed at 01/16/2025 1405   Pre-operative insulin treatment 0 filed at 01/16/2025 1405   Pre-operative creatinine>2 mg/dl 0 filed at 01/16/2025 1405   Revised Cardiac Risk Calculator 0 filed at 01/16/2025 1405          Apfel Simplified Score    No data to display       Risk Analysis Index Results This Encounter    No data found in the last 10 encounters.       Stop Bang  Score      Flowsheet Row Pre-Admission Testing from 1/16/2025 in [facility]   Do you snore loudly? 0 filed at 01/16/2025 1305   Do you often feel tired or fatigued after your sleep? 0 filed at 01/16/2025 1305   Has anyone ever observed you stop breathing in your sleep? 0 filed at 01/16/2025 1305   Do you have or are you being treated for high blood pressure? 0 filed at 01/16/2025 1305   Recent BMI (Calculated) 27.3 filed at 01/16/2025 1305   Is BMI greater than 35 kg/m2? 0=No filed at 01/16/2025 1305   Age older than 50 years old? 1=Yes filed at 01/16/2025 1305   Is your neck circumference greater than 17 inches (Male) or 16 inches (Female)? 0 filed at 01/16/2025 1305   Gender - Male 1=Yes filed at 01/16/2025 1305   STOP-BANG Total Score 2 filed at 01/16/2025 1305          Prodigy: High Risk  Total Score: 16              Prodigy Age Score      Prodigy Gender Score          ARISCAT Score for Postoperative Pulmonary Complications    No data to display       Haque Perioperative Risk for Myocardial Infarction or Cardiac Arrest (ZORAN)      Flowsheet Row Pre-Admission Testing from 1/16/2025 in [facility]   Calculated Age Score 1.36 filed at 01/16/2025 1405   Functional Status  0 filed at 01/16/2025 1405   ASA Class  -3.29 filed at 01/16/2025 1405   Creatinine 0 filed at 01/16/2025 1405   Type of Procedure  -0.26 filed at 01/16/2025 1405   ZORAN Total Score  -7.44 filed at 01/16/2025 1405   ZORAN % 0.06 filed at 01/16/2025 1405            Assessment & Plan:    Neuro:  No diagnosis or significant findings on chart review or clinical presentation and evaluation.     HEENT/Airway:  No diagnosis or significant findings on chart review or clinical presentation and evaluation.   STOP-BANG Score- 2 points lowrisk for BENEDICTO    Mallampati::  II    TM distance::  >3 FB    Neck ROM::  Full  Dentures-denies  Crowns-reports  Implants-denies    Cardiovascular:  HTN, HLD (rosuvastatin)   METS: 6.7  RCRI: 0  points, 3.9%  risk for postoperative MACE   ZORAN: 0.06% risk for postoperative MACE  EKG -normal sinus rhythm Rate- No acute changes.     Pulmonary:  No diagnosis or significant findings on chart review or clinical presentation and evaluation.   ARISCAT: <26 points, 1.6% risk of in-hospital postoperative pulmonary complication  PRODIGY: Low risk for opioid induced respiratory depression  Smoking History- he will have an occasional cigar  Discussed smoking cessation and deep breathing handout given    Renal/Urinary:    BPH (tamsulosin)   however, the patient is at increased risk of perioperative renal complications secondary to HTN. Preventative measures include BP monitoring, medication compliance, and hydration management.   CMP-Pending  Creatinine-  GFR-    Endocrine:  No diagnosis or significant findings on chart review or clinical presentation and evaluation.     Hematologic/Immunology:  No diagnosis or significant findings on chart review or clinical presentation and evaluation.  The patient is not a Jehovah’s witness and will accept blood and blood products if medically indicated.   History of previous blood transfusions No  CBC-Pending  HGB-Pending  Caprini Score 10, patient at High for postoperative DVT. Pt supplied education/VTE handout  Anticoagulation use: No     Gastrointestinal:   No diagnosis or significant findings on chart review or clinical presentation and evaluation.   Recreational drug use: none  Alcohol use 5 beers per week    Infectious disease:   No diagnosis or significant findings on chart review or clinical presentation and evaluation.     Musculoskeletal:   No diagnosis or significant findings on chart review or clinical presentation and evaluation.   JHFRAT score-2 points. low risk for falls    Anesthesia:  ASA 2 - Patient with mild systemic disease with no functional limitations  Anticipated anesthesia-General  History of General anesthesia- yes  Complications- No anesthesia  complications  No family history of anesthesia complications    Abnormalities noted on PAT evaluation: Yes - Pt amputated several fingers on his left hand 2 weeks ago while using a table saw. He has IV antibiotics and 10 days of home antibiotics. He see's a hand surgeon tomorrow and will call Dr. Adams office with any updates/impending surgery     Labs & Imaging ordered:  Labs are in   Nickel/metal allergy-negative  Shellfish allergy-negative    Discussed with patient medication instructions, NPO guidelines, and any questions or concerns.      time spent 45 minutes

## 2025-01-16 NOTE — PREPROCEDURE INSTRUCTIONS
Medication List            Accurate as of January 16, 2025  1:15 PM. Always use your most recent med list.                naproxen 250 mg tablet  Commonly known as: Naprosyn  Additional Medication Adjustments for Surgery: Take last dose 7 days before surgery  Notes to patient: Last dose preoperatively on 1/22/25     rosuvastatin 40 mg tablet  Commonly known as: Crestor  Take 1 tablet (40 mg) by mouth once daily.  Medication Adjustments for Surgery: Take/Use as prescribed     tamsulosin 0.4 mg 24 hr capsule  Commonly known as: Flomax  TAKE 2 CAPSULES BY MOUTH EVERY DAY  Medication Adjustments for Surgery: Take/Use as prescribed            NPO Instructions:     Do not eat any food after midnight the night before your surgery/procedure.  You may have clear liquids until TWO hours before surgery/procedure. This includes water, black tea/coffee, (no milk or cream) apple juice and electrolyte drinks (Gatorade).  You may chew gum up to TWO hours before your surgery/procedure.     Additional Instructions:      Seven/Six Days before Surgery:  Review your medication instructions, stop indicated medications  Five Days before Surgery:  Review your medication instructions, stop indicated medications  Three Days before Surgery:  Review your medication instructions, stop indicated medications  The Day before Surgery:  No smoking or alcohol use 24 hours before surgery  Review your medication instructions, stop indicated medications  You will be contacted regarding the time of your arrival to facility and surgery time  Do not eat any food after Midnight  Day of Surgery:  Review your medication instructions, take indicated medications  If you have diabetes, please check your fasting blood sugar upon awakening.  If fasting blood sugar is <80 mg/dl, drink 100 ml of apple juice, time limit of 2 hours before  You may have clear liquids until TWO hours before surgery/procedure.  This includes water, black tea/coffee, (no milk or cream)  apple juice and electrolyte drinks (Gatorade)  You may chew gum up to TWO hours before your surgery/procedure  Wear  comfortable loose fitting clothing  Do not use moisturizers, creams, lotions or perfume  All jewelry and valuables should be left at home     CONTACT SURGEON'S OFFICE IF YOU DEVELOP:  * Fever = 100.4 F   * New respiratory symptoms (e.g. cough, shortness of breath, respiratory distress, sore throat)  * Recent loss of taste or smell  *Flu like symptoms such as headache, fatigue or gastrointestinal symptoms  * You develop any open sores, shingles, burning or painful urination   AND/OR:  * You no longer wish to have the surgery.  * Any other personal circumstances change that may lead to the need to cancel or defer this surgery.  *You were admitted to any hospital within one week of your planned procedure.     SMOKING:  *Quitting smoking can make a huge difference to your health and recovery from surgery.    *If you need help with quitting, call 3-856-QUIT-NOW.     THE DAY BEFORE SURGERY:  *Do not eat any food after midnight the night before your surgery.   *You may have up to TEN OUNCES of clear liquids until TWO hours before your instructed ARRIVAL TIME to hospital. This includes water, black tea/coffee, (no milk or cream) apple juice, clear broth and electrolyte drinks (Gatorade). Please avoid clear liquids that are red in color.   *You may chew gum/mints up to TWO hours before your surgery/procedure.     SURGICAL TIME:  *You will be contacted between 2 p.m. and 3 p.m. the business day before your surgery with your arrival time.  *If you haven't received a call by 3pm, call [phone]  *Scheduled surgery times may change and you will be notified if this occurs-check your personal voicemail for any updates.     ON THE MORNING OF SURGERY:  *Wear comfortable, loose fitting clothing.   *Do not use moisturizers, creams, lotions or perfume.  *All jewelry and valuables should be left at home.  *Prosthetic  devices such as contact lenses, hearing aids, dentures, eyelash extensions, hairpins and body piercing must be removed before surgery.     BRING WITH YOU:  *Photo ID and insurance card  *Current list of medications and allergies  *Pacemaker/Defibrillator/Heart stent cards  *CPAP machine and mask  *Slings/splints/crutches  *Copy of your complete Advanced Directive/DHPOA-if applicable  *Neurostimulator implant remote     PARKING AND ARRIVAL:  *Check in at the Main Entrance desk and let them know you are here for surgery.     IF YOU ARE HAVING OUTPATIENT/SAME DAY SURGERY:  *A responsible adult MUST accompany you at the time of discharge and stay with you for 24 hours after your surgery.  *You may NOT drive yourself home after surgery.  *You may use a taxi or ride sharing service ([employer], Uber) to return home ONLY if you are accompanied by a friend or family member.  *Instructions for resuming your medications will be provided by your surgeon.     Thank you for coming to Pre Admission testing.      If I have prescribed medication please don't forget to  at your pharmacy.      Any questions about today's visit call [phone] and leave a message in the general mailbox.     Patient instructed to ambulate as soon as possible postoperatively to decrease thromboembolic risk.     Cece Reid APRN-CNP     Thank you for visiting [facility].  If you have any changes to your health condition, please call the surgeons office to alert them and give them details of your symptoms.        Preoperative Fasting Guidelines     Why must I stop eating and drinking near surgery time?  With sedation, food or liquid in your stomach can enter your lungs causing serious complications  Increases nausea and vomiting     When do I need to stop eating and drinking before my surgery?  Do not eat any food after midnight the night before your surgery/procedure.  You may have up to TEN ounces of clear liquid until TWO  hours before your instructed arrival time to the hospital.  This includes water, black tea/coffee, (no milk or cream) apple juice, and electrolyte drinks (Gatorade)  You may chew gum until TWO hours before your surgery/procedure        Additional Instructions:      The Day before Surgery:  -Review your medication instructions, stop indicated medications  -You will be contacted in the evening regarding the time of your arrival to facility and surgery time     Day of Surgery:  -Review your medication instructions, take indicated medications  -Wear comfortable loose fitting clothing  -Do not use moisturizers, creams, lotions or perfume  -All jewelry and valuables should be left at home                   Preoperative Brain Exercises     What are brain exercises?  A brain exercise is any activity that engages your thinking (cognitive) skills.     What types of activities are considered brain exercises?  Jigsaw puzzles, crossword puzzles, word jumble, memory games, word search, and many more.  Many can be found free online or on your phone via a mobile melani.     Why should I do brain exercises before my surgery?  More recent research has shown brain exercise before surgery can lower the risk of postoperative delirium (confusion) which can be especially important for older adults.  Patients who did brain exercises for 5 to 10 hours the days before surgery, cut their risk of postoperative delirium in half up to 1 week after surgery.                         [facility]     Preoperative Deep Breathing Exercises     Why it is important to do deep breathing exercises before my surgery?  Deep breathing exercises strengthen your breathing muscles.  This helps you to recover after your surgery and decreases the chance of breathing complications.        How are the deep breathing exercises done?  Sit straight with your back supported.  Breathe in deeply and slowly through your nose. Your lower rib cage  should expand and your abdomen may move forward.  Hold that breath for 3 to 5 seconds.  Breathe out through pursed lips, slowly and completely.  Rest and repeat 10 times every hour while awake.  Rest longer if you become dizzy or lightheaded.                      [facility]     Preoperative Deep Breathing Exercises     Why it is important to do deep breathing exercises before my surgery?  Deep breathing exercises strengthen your breathing muscles.  This helps you to recover after your surgery and decreases the chance of breathing complications.        How are the deep breathing exercises done?  Sit straight with your back supported.  Breathe in deeply and slowly through your nose. Your lower rib cage should expand and your abdomen may move forward.  Hold that breath for 3 to 5 seconds.  Breathe out through pursed lips, slowly and completely.  Rest and repeat 10 times every hour while awake.  Rest longer if you become dizzy or lightheaded.        Patient Information: Incentive Spirometer  What is an incentive spirometer?  An incentive spirometer is a device used before and after surgery to “exercise” your lungs.  It helps you to take deeper breaths to expand your lungs.  Below is an example of a basic incentive spirometer.  The device you receive may differ slightly but they all function the same.    Why do I need to use an incentive spirometer?  Using your incentive spirometer prepares your lungs for surgery and helps prevent lung problems after surgery.  How do I use my incentive spirometer?  When you're using your incentive spirometer, make sure to breathe through your mouth. If you breathe through your nose, the incentive spirometer won't work properly. You can hold your nose if you have trouble.  If you feel dizzy at any time, stop and rest. Try again at a later time.  Follow the steps below:  Set up your incentive spirometer, expand the flexible tubing and connect to the outlet.  Sit  upright in a chair or bed. Hold the incentive spirometer at eye level.   Put the mouthpiece in your mouth and close your lips tightly around it. Slowly breathe out (exhale) completely.  Breathe in (inhale) slowly through your mouth as deeply as you can. As you take a breath, you will see the piston rise inside the large column. While the piston rises, the indicator should move upwards. It should stay in between the 2 arrows (see Figure).  Try to get the piston as high as you can, while keeping the indicator between the arrows.   If the indicator doesn't stay between the arrows, you're breathing either too fast or too slow.  When you get it as high as you can, hold your breath for 10 seconds, or as long as possible. While you're holding your breath, the piston will slowly fall to the base of the spirometer.  Once the piston reaches the bottom of the spirometer, breathe out slowly through your mouth. Rest for a few seconds.  Repeat 10 times. Try to get the piston to the same level with each breath.  Repeat every hour while awake  You can carefully clean the outside of the mouthpiece with an alcohol wipe or soap and water.       Patient and Family Education             Ways You Can Help Prevent Blood Clots                    This handout explains some simple things you can do to help prevent blood clots.      Blood clots are blockages that can form in the body's veins. When a blood clot forms in your deep veins, it may be called a deep vein thrombosis, or DVT for short. Blood clots can happen in any part of the body where blood flows, but they are most common in the arms and legs. If a piece of a blood clot breaks free and travels to the lungs, it is called a pulmonary embolus (PE). A PE can be a very serious problem.         Being in the hospital or having surgery can raise your chances of getting a blood clot because you may not be well enough to move around as much as you normally do.         Ways you can help  prevent blood clots in the hospital           Wearing SCDs. SCDs stands for Sequential Compression Devices.   SCDs are special sleeves that wrap around your legs  They attach to a pump that fills them with air to gently squeeze your legs every few minutes.   This helps return the blood in your legs to your heart.   SCDs should only be taken off when walking or bathing.   SCDs may not be comfortable, but they can help save your life.                                            Wearing compression stockings - if your doctor orders them. These special snug fitting stockings gently squeeze your legs to help blood flow.       Walking. Walking helps move the blood in your legs.   If your doctor says it is ok, try walking the halls at least   5 times a day. Ask us to help you get up, so you don't fall.      Taking any blood thinning medicines your doctor orders.        Page 1 of 2            [facility]; 3/23   Ways you can help prevent blood clots at home         Wearing compression stockings - if your doctor orders them. ? Walking - to help move the blood in your legs.       Taking any blood thinning medicines your doctor orders.      Signs of a blood clot or PE        Tell your doctor or nurse know right away if you have of the problems listed below.    If you are at home, seek medical care right away. Call 911 for chest pain or problems breathing.                Signs of a blood clot (DVT) - such as pain,  swelling, redness or warmth in your arm or leg      Signs of a pulmonary embolism (PE) - such as chest pain or feeling short of breath

## 2025-01-16 NOTE — CPM/PAT H&P
"CPM/PAT Evaluation       Name: Brock Vaughan (Brock Vaughan)  /Age: 1955/69 y.o.     In-Person       Chief Complaint: Hypertrophy of prostate with urinary obstruction     HPI  Patient is an alert and oriented 69 year old male scheduled for a  HoLEP on 25 with Dr. Cortez for  Hypertrophy of prostate with urinary obstruction . PMHX includes HLD, HTN. Presents to [facility] PAT today for perioperative risk stratification and optimization.       Past Medical History:   Diagnosis Date    Allergic contact dermatitis due to plants, except food 09/10/2020    Poison ivy dermatitis    Cellulitis of left lower limb 09/10/2020    Cellulitis of left lower extremity       Past Surgical History:   Procedure Laterality Date    COLONOSCOPY  2014    Complete Colonoscopy    HEMORRHOID SURGERY  2014    Hemorrhoidectomy    HERNIA REPAIR  2014    Hernia Repair    OTHER SURGICAL HISTORY  2014    Wrist Surgery    ROTATOR CUFF REPAIR  2014    Rotator Cuff Repair    TONSILLECTOMY  2014    Tonsillectomy       Patient  has no history on file for sexual activity.    Family History   Problem Relation Name Age of Onset    Heart disease Mother Michelle vaughan     Heart disease Father Augusto vaughan        No Known Allergies    Prior to Admission medications    Medication Sig Start Date End Date Taking? Authorizing Provider   naproxen (Naprosyn) 250 mg tablet Take 1 tablet (250 mg) by mouth 2 times daily (morning and late afternoon).   Yes Historical Provider, MD   rosuvastatin (Crestor) 40 mg tablet Take 1 tablet (40 mg) by mouth once daily. 9/6/24 3/5/25 Yes Brandon Ramirez DO   tamsulosin (Flomax) 0.4 mg 24 hr capsule TAKE 2 CAPSULES BY MOUTH EVERY DAY 24  Yes Brandon Ramirez DO          Visit Vitals  Pulse 70   Temp 37.1 °C (98.8 °F) (Temporal)   Resp 16   Ht 1.778 m (5' 10\")   Wt 86.2 kg (190 lb)   SpO2 100%   BMI 27.26 kg/m²   Smoking Status Never   BSA 2.06 m²      Review of Systems "   Constitutional: Negative for chills, decreased appetite, diaphoresis, fever and malaise/fatigue.   Eyes:  Negative for blurred vision and double vision.   Cardiovascular:  Negative for chest pain, claudication, cyanosis, dyspnea on exertion, irregular heartbeat, leg swelling, near-syncope and palpitations.   Respiratory:  Negative for cough, hemoptysis, shortness of breath and wheezing.    Endocrine: Negative for cold intolerance, heat intolerance, polydipsia, polyphagia and polyuria.   Gastrointestinal:  Negative for abdominal pain, constipation, diarrhea, dysphagia, nausea and vomiting.   Genitourinary:  Negative for bladder incontinence, dysuria, hematuria, incomplete emptying, nocturia, frequency, pelvic pain and urgency.   Neurological:  Negative for headaches, light-headedness, paresthesias, sensory change and weakness.   Psychiatric/Behavioral:  Negative for altered mental status.    Musculoskeletal: Negative for myalgias, arthralgias     Vitals and nursing note reviewed.     Physical exam  Constitutional:       Appearance: Normal appearance. He is Overweight.   HENT:      Head: Normocephalic and atraumatic.      Mouth/Throat:      Mouth: Mucous membranes are moist.      Pharynx: Oropharynx is clear.   Eyes:      Extraocular Movements: Extraocular movements intact.      Conjunctiva/sclera: Conjunctivae normal.      Pupils: Pupils are equal, round, and reactive to light.   Cardiovascular:      PMI at left midclavicular line. Normal rate. Regular rhythm. Normal S1. Normal S2.       Murmurs: There is no murmur.      No gallop.  No click. No rub.       No audible carotid bruit     No lower extremity edema on exam  Pulmonary:      Effort: Pulmonary effort is normal.      Breath sounds: Normal breath sounds.   Abdominal:      General: Abdomen is flat. Bowel sounds are normal.      Palpations: Abdomen is soft and non-tender  Musculoskeletal:      Cervical back: Normal range of motion and neck supple.   Skin:      General: Skin is warm and dry.      Capillary Refill: Capillary refill takes less than 2 seconds.   Neurological:      General: No focal deficit present.      Mental Status: He is alert and oriented to person, place, and time. Mental status is at baseline.   Psychiatric:         Mood and Affect: Mood normal.         Behavior: Behavior normal.         Thought Content: Thought content normal.         Judgment: Judgment normal.     Vitals and nursing note reviewed. Physical exam within normal limits.   DASI Risk Score      Flowsheet Row Pre-Admission Testing from 1/16/2025 in [facility]   Can you take care of yourself (eat, dress, bathe, or use toilet)?  2.75 filed at 01/16/2025 1405   Can you walk indoors, such as around your house? 1.75 filed at 01/16/2025 1405   Can you walk a block or two on level ground?  2.75 filed at 01/16/2025 1405   Can you climb a flight of stairs or walk up a hill? 5.5 filed at 01/16/2025 1405   Can you run a short distance? 8 filed at 01/16/2025 1405   Can you do light work around the house like dusting or washing dishes? 2.7 filed at 01/16/2025 1405   Can you do moderate work around the house like vacuuming, sweeping floors or carrying groceries? 3.5 filed at 01/16/2025 1405   Can you do heavy work around the house like scrubbing floors or lifting and moving heavy furniture?  0 filed at 01/16/2025 1405   Can you do yard work like raking leaves, weeding or pushing a mower? 0 filed at 01/16/2025 1405   Can you have sexual relations? 5.25 filed at 01/16/2025 1405   Can you participate in moderate recreational activities like golf, bowling, dancing, doubles tennis or throwing a baseball or football? 0 filed at 01/16/2025 1405   Can you participate in strenous sports like swimming, singles tennis, football, basketball, or skiing? 0 filed at 01/16/2025 1405   DASI SCORE 32.2 filed at 01/16/2025 1405   METS Score (Will be calculated only when all the questions are answered) 6.7  filed at 01/16/2025 1405          Caprini DVT Assessment      Flowsheet Row Pre-Admission Testing from 1/16/2025 in [facility]   DVT Score (IF A SCORE IS NOT CALCULATING, MUST SELECT A BMI TO COMPLETE) 10 filed at 01/16/2025 1403   Surgical Factors History of multiple trauma <1 month, Major surgery planned, including arthroscopic and laproscopic (1-2 hours) filed at 01/16/2025 1403   BMI (BMI MUST BE CHOSEN) 30 or less filed at 01/16/2025 1403          Modified Frailty Index    No data to display       CHADS2 Stroke Risk  Current as of 11 minutes ago        N/A 3 to 100%: High Risk   2 to < 3%: Medium Risk   0 to < 2%: Low Risk     Last Change: N/A          This score determines the patient's risk of having a stroke if the patient has atrial fibrillation.        This score is not applicable to this patient. Components are not calculated.          Revised Cardiac Risk Index      Flowsheet Row Pre-Admission Testing from 1/16/2025 in [facility]   High-Risk Surgery (Intraperitoneal, Intrathoracic,Suprainguinal vascular) 0 filed at 01/16/2025 1405   History of ischemic heart disease (History of MI, History of positive exercuse test, Current chest paint considered due to myocardial ischemia, Use of nitrate therapy, ECG with pathological Q Waves) 0 filed at 01/16/2025 1405   History of congestive heart failure (pulmonary edemia, bilateral rales or S3 gallop, Paroxysmal nocturnal dyspnea, CXR showing pulmonary vascular redistribution) 0 filed at 01/16/2025 1405   History of cerebrovascular disease (Prior TIA or stroke) 0 filed at 01/16/2025 1405   Pre-operative insulin treatment 0 filed at 01/16/2025 1405   Pre-operative creatinine>2 mg/dl 0 filed at 01/16/2025 1405   Revised Cardiac Risk Calculator 0 filed at 01/16/2025 1405          Apfel Simplified Score    No data to display       Risk Analysis Index Results This Encounter    No data found in the last 10 encounters.       Stop Bang  Score      Flowsheet Row Pre-Admission Testing from 1/16/2025 in [facility] [place]   Do you snore loudly? 0 filed at 01/16/2025 1305   Do you often feel tired or fatigued after your sleep? 0 filed at 01/16/2025 1305   Has anyone ever observed you stop breathing in your sleep? 0 filed at 01/16/2025 1305   Do you have or are you being treated for high blood pressure? 0 filed at 01/16/2025 1305   Recent BMI (Calculated) 27.3 filed at 01/16/2025 1305   Is BMI greater than 35 kg/m2? 0=No filed at 01/16/2025 1305   Age older than 50 years old? 1=Yes filed at 01/16/2025 1305   Is your neck circumference greater than 17 inches (Male) or 16 inches (Female)? 0 filed at 01/16/2025 1305   Gender - Male 1=Yes filed at 01/16/2025 1305   STOP-BANG Total Score 2 filed at 01/16/2025 1305          Prodigy: High Risk  Total Score: 16              Prodigy Age Score      Prodigy Gender Score          ARISCAT Score for Postoperative Pulmonary Complications    No data to display       Haque Perioperative Risk for Myocardial Infarction or Cardiac Arrest (ZORAN)      Flowsheet Row Pre-Admission Testing from 1/16/2025 in [facility] [place]   Calculated Age Score 1.36 filed at 01/16/2025 1405   Functional Status  0 filed at 01/16/2025 1405   ASA Class  -3.29 filed at 01/16/2025 1405   Creatinine 0 filed at 01/16/2025 1405   Type of Procedure  -0.26 filed at 01/16/2025 1405   ZORAN Total Score  -7.44 filed at 01/16/2025 1405   ZORAN % 0.06 filed at 01/16/2025 1405            Assessment & Plan:    Neuro:  No diagnosis or significant findings on chart review or clinical presentation and evaluation.     HEENT/Airway:  No diagnosis or significant findings on chart review or clinical presentation and evaluation.   STOP-BANG Score- 2 points lowrisk for BENEDICTO    Mallampati::  II    TM distance::  >3 FB    Neck ROM::  Full  Dentures-denies  Crowns-reports  Implants-denies    Cardiovascular:  HTN, HLD (rosuvastatin)   METS: 6.7  RCRI: 0  points, 3.9%  risk for postoperative MACE   ZORAN: 0.06% risk for postoperative MACE  EKG -normal sinus rhythm Rate- No acute changes.     Pulmonary:  No diagnosis or significant findings on chart review or clinical presentation and evaluation.   ARISCAT: <26 points, 1.6% risk of in-hospital postoperative pulmonary complication  PRODIGY: Low risk for opioid induced respiratory depression  Smoking History- he will have an occasional cigar  Discussed smoking cessation and deep breathing handout given    Renal/Urinary:    BPH (tamsulosin)   however, the patient is at increased risk of perioperative renal complications secondary to HTN. Preventative measures include BP monitoring, medication compliance, and hydration management.   CMP-Pending  Creatinine-  GFR-    Endocrine:  No diagnosis or significant findings on chart review or clinical presentation and evaluation.     Hematologic/Immunology:  No diagnosis or significant findings on chart review or clinical presentation and evaluation.  The patient is not a Jehovah’s witness and will accept blood and blood products if medically indicated.   History of previous blood transfusions No  CBC-Pending  HGB-Pending  Caprini Score 10, patient at High for postoperative DVT. Pt supplied education/VTE handout  Anticoagulation use: No     Gastrointestinal:   No diagnosis or significant findings on chart review or clinical presentation and evaluation.   Recreational drug use: none  Alcohol use 5 beers per week    Infectious disease:   No diagnosis or significant findings on chart review or clinical presentation and evaluation.     Musculoskeletal:   No diagnosis or significant findings on chart review or clinical presentation and evaluation.   JHFRAT score-2 points. low risk for falls    Anesthesia:  ASA 2 - Patient with mild systemic disease with no functional limitations  Anticipated anesthesia-General  History of General anesthesia- yes  Complications- No anesthesia  complications  No family history of anesthesia complications    Abnormalities noted on PAT evaluation: Yes - Pt amputated several fingers on his left hand 2 weeks ago while using a table saw. He has IV antibiotics and 10 days of home antibiotics. He see's a hand surgeon tomorrow and will call Dr. Adams office with any updates/impending surgery     Labs & Imaging ordered:  Labs are in   Nickel/metal allergy-negative  Shellfish allergy-negative    Discussed with patient medication instructions, NPO guidelines, and any questions or concerns.      time spent 45 minutes

## 2025-01-23 ENCOUNTER — LAB (OUTPATIENT)
Dept: LAB | Facility: LAB | Age: 70
End: 2025-01-23
Payer: MEDICARE

## 2025-01-23 DIAGNOSIS — N13.8 HYPERTROPHY OF PROSTATE WITH URINARY OBSTRUCTION: ICD-10-CM

## 2025-01-23 DIAGNOSIS — Z01.818 PREOP TESTING: ICD-10-CM

## 2025-01-23 DIAGNOSIS — N33 BLADDER DISORDERS IN DISEASES CLASSIFIED ELSEWHERE: ICD-10-CM

## 2025-01-23 DIAGNOSIS — N40.1 HYPERTROPHY OF PROSTATE WITH URINARY OBSTRUCTION: ICD-10-CM

## 2025-01-23 LAB
ERYTHROCYTE [DISTWIDTH] IN BLOOD BY AUTOMATED COUNT: 12.3 % (ref 11.5–14.5)
HCT VFR BLD AUTO: 43.1 % (ref 41–52)
HGB BLD-MCNC: 14 G/DL (ref 13.5–17.5)
INR PPP: 1.1 (ref 0.9–1.1)
MCH RBC QN AUTO: 30.2 PG (ref 26–34)
MCHC RBC AUTO-ENTMCNC: 32.5 G/DL (ref 32–36)
MCV RBC AUTO: 93 FL (ref 80–100)
NRBC BLD-RTO: 0 /100 WBCS (ref 0–0)
PLATELET # BLD AUTO: 207 X10*3/UL (ref 150–450)
PROTHROMBIN TIME: 12.1 SECONDS (ref 9.8–12.8)
RBC # BLD AUTO: 4.63 X10*6/UL (ref 4.5–5.9)
WBC # BLD AUTO: 4.8 X10*3/UL (ref 4.4–11.3)

## 2025-01-23 PROCEDURE — 85610 PROTHROMBIN TIME: CPT

## 2025-01-23 PROCEDURE — 87086 URINE CULTURE/COLONY COUNT: CPT

## 2025-01-23 PROCEDURE — 80048 BASIC METABOLIC PNL TOTAL CA: CPT

## 2025-01-23 PROCEDURE — 85027 COMPLETE CBC AUTOMATED: CPT

## 2025-01-24 LAB
ANION GAP SERPL CALC-SCNC: 12 MMOL/L (ref 10–20)
BACTERIA UR CULT: NO GROWTH
BUN SERPL-MCNC: 20 MG/DL (ref 6–23)
CALCIUM SERPL-MCNC: 9.2 MG/DL (ref 8.6–10.6)
CHLORIDE SERPL-SCNC: 105 MMOL/L (ref 98–107)
CO2 SERPL-SCNC: 28 MMOL/L (ref 21–32)
CREAT SERPL-MCNC: 0.91 MG/DL (ref 0.5–1.3)
EGFRCR SERPLBLD CKD-EPI 2021: >90 ML/MIN/1.73M*2
GLUCOSE SERPL-MCNC: 72 MG/DL (ref 74–99)
POTASSIUM SERPL-SCNC: 3.8 MMOL/L (ref 3.5–5.3)
SODIUM SERPL-SCNC: 141 MMOL/L (ref 136–145)

## 2025-01-27 ENCOUNTER — DOCUMENTATION (OUTPATIENT)
Dept: PREADMISSION TESTING | Facility: HOSPITAL | Age: 70
End: 2025-01-27
Payer: MEDICARE

## 2025-01-30 ENCOUNTER — HOSPITAL ENCOUNTER (OUTPATIENT)
Facility: HOSPITAL | Age: 70
Setting detail: OUTPATIENT SURGERY
Discharge: HOME | End: 2025-01-30
Attending: UROLOGY | Admitting: UROLOGY
Payer: MEDICARE

## 2025-01-30 ENCOUNTER — ANESTHESIA EVENT (OUTPATIENT)
Dept: OPERATING ROOM | Facility: HOSPITAL | Age: 70
End: 2025-01-30
Payer: MEDICARE

## 2025-01-30 ENCOUNTER — ANESTHESIA (OUTPATIENT)
Dept: OPERATING ROOM | Facility: HOSPITAL | Age: 70
End: 2025-01-30
Payer: MEDICARE

## 2025-01-30 VITALS
DIASTOLIC BLOOD PRESSURE: 88 MMHG | OXYGEN SATURATION: 97 % | WEIGHT: 192.02 LBS | BODY MASS INDEX: 27.49 KG/M2 | RESPIRATION RATE: 16 BRPM | SYSTOLIC BLOOD PRESSURE: 129 MMHG | HEIGHT: 70 IN | TEMPERATURE: 97.2 F | HEART RATE: 72 BPM

## 2025-01-30 DIAGNOSIS — I10 BENIGN ESSENTIAL HYPERTENSION: Primary | ICD-10-CM

## 2025-01-30 DIAGNOSIS — N40.1 HYPERTROPHY OF PROSTATE WITH URINARY OBSTRUCTION: ICD-10-CM

## 2025-01-30 DIAGNOSIS — N13.8 HYPERTROPHY OF PROSTATE WITH URINARY OBSTRUCTION: ICD-10-CM

## 2025-01-30 PROCEDURE — 7100000002 HC RECOVERY ROOM TIME - EACH INCREMENTAL 1 MINUTE: Performed by: UROLOGY

## 2025-01-30 PROCEDURE — 3700000001 HC GENERAL ANESTHESIA TIME - INITIAL BASE CHARGE: Performed by: UROLOGY

## 2025-01-30 PROCEDURE — C1782 MORCELLATOR: HCPCS | Performed by: UROLOGY

## 2025-01-30 PROCEDURE — 2720000007 HC OR 272 NO HCPCS: Performed by: UROLOGY

## 2025-01-30 PROCEDURE — 2500000004 HC RX 250 GENERAL PHARMACY W/ HCPCS (ALT 636 FOR OP/ED)

## 2025-01-30 PROCEDURE — 3600000004 HC OR TIME - INITIAL BASE CHARGE - PROCEDURE LEVEL FOUR: Performed by: UROLOGY

## 2025-01-30 PROCEDURE — 2500000005 HC RX 250 GENERAL PHARMACY W/O HCPCS

## 2025-01-30 PROCEDURE — 2500000005 HC RX 250 GENERAL PHARMACY W/O HCPCS: Performed by: UROLOGY

## 2025-01-30 PROCEDURE — A52649 PR LASER ENUCLEATION PROSTATE W MORCELLATION: Performed by: ANESTHESIOLOGY

## 2025-01-30 PROCEDURE — 3600000009 HC OR TIME - EACH INCREMENTAL 1 MINUTE - PROCEDURE LEVEL FOUR: Performed by: UROLOGY

## 2025-01-30 PROCEDURE — 52649 PROSTATE LASER ENUCLEATION: CPT | Performed by: UROLOGY

## 2025-01-30 PROCEDURE — C1769 GUIDE WIRE: HCPCS | Performed by: UROLOGY

## 2025-01-30 PROCEDURE — C1758 CATHETER, URETERAL: HCPCS | Performed by: UROLOGY

## 2025-01-30 PROCEDURE — 3700000002 HC GENERAL ANESTHESIA TIME - EACH INCREMENTAL 1 MINUTE: Performed by: UROLOGY

## 2025-01-30 PROCEDURE — 7100000001 HC RECOVERY ROOM TIME - INITIAL BASE CHARGE: Performed by: UROLOGY

## 2025-01-30 PROCEDURE — 0754T DGTZ GLS MCRSCP SLD LEVEL V: CPT | Mod: TC,SUR,BEALAB | Performed by: UROLOGY

## 2025-01-30 PROCEDURE — 7100000009 HC PHASE TWO TIME - INITIAL BASE CHARGE: Performed by: UROLOGY

## 2025-01-30 PROCEDURE — 2500000004 HC RX 250 GENERAL PHARMACY W/ HCPCS (ALT 636 FOR OP/ED): Performed by: UROLOGY

## 2025-01-30 PROCEDURE — 7100000010 HC PHASE TWO TIME - EACH INCREMENTAL 1 MINUTE: Performed by: UROLOGY

## 2025-01-30 PROCEDURE — A52649 PR LASER ENUCLEATION PROSTATE W MORCELLATION

## 2025-01-30 RX ORDER — SULFAMETHOXAZOLE AND TRIMETHOPRIM 800; 160 MG/1; MG/1
1 TABLET ORAL 2 TIMES DAILY
Qty: 14 TABLET | Refills: 0 | Status: SHIPPED | OUTPATIENT
Start: 2025-01-30 | End: 2025-01-30 | Stop reason: HOSPADM

## 2025-01-30 RX ORDER — METOCLOPRAMIDE 10 MG/1
10 TABLET ORAL ONCE
Status: CANCELLED | OUTPATIENT
Start: 2025-01-30 | End: 2025-01-30

## 2025-01-30 RX ORDER — ROCURONIUM BROMIDE 10 MG/ML
INJECTION, SOLUTION INTRAVENOUS AS NEEDED
Status: DISCONTINUED | OUTPATIENT
Start: 2025-01-30 | End: 2025-01-30

## 2025-01-30 RX ORDER — ONDANSETRON 4 MG/1
4 TABLET, ORALLY DISINTEGRATING ORAL ONCE AS NEEDED
Status: CANCELLED | OUTPATIENT
Start: 2025-01-30

## 2025-01-30 RX ORDER — HYDROMORPHONE HYDROCHLORIDE 0.2 MG/ML
0.1 INJECTION INTRAMUSCULAR; INTRAVENOUS; SUBCUTANEOUS EVERY 5 MIN PRN
Status: DISCONTINUED | OUTPATIENT
Start: 2025-01-30 | End: 2025-01-30 | Stop reason: HOSPADM

## 2025-01-30 RX ORDER — PROPOFOL 10 MG/ML
INJECTION, EMULSION INTRAVENOUS AS NEEDED
Status: DISCONTINUED | OUTPATIENT
Start: 2025-01-30 | End: 2025-01-30

## 2025-01-30 RX ORDER — GLYCOPYRROLATE 0.2 MG/ML
0.2 INJECTION INTRAMUSCULAR; INTRAVENOUS ONCE
Status: DISCONTINUED | OUTPATIENT
Start: 2025-01-30 | End: 2025-01-30 | Stop reason: HOSPADM

## 2025-01-30 RX ORDER — LIDOCAINE HYDROCHLORIDE 10 MG/ML
INJECTION, SOLUTION EPIDURAL; INFILTRATION; INTRACAUDAL; PERINEURAL AS NEEDED
Status: DISCONTINUED | OUTPATIENT
Start: 2025-01-30 | End: 2025-01-30

## 2025-01-30 RX ORDER — ONDANSETRON HYDROCHLORIDE 2 MG/ML
4 INJECTION, SOLUTION INTRAVENOUS ONCE AS NEEDED
Status: DISCONTINUED | OUTPATIENT
Start: 2025-01-30 | End: 2025-01-30 | Stop reason: HOSPADM

## 2025-01-30 RX ORDER — NORETHINDRONE AND ETHINYL ESTRADIOL 0.5-0.035
50 KIT ORAL ONCE
Status: DISCONTINUED | OUTPATIENT
Start: 2025-01-30 | End: 2025-01-30 | Stop reason: HOSPADM

## 2025-01-30 RX ORDER — PHENAZOPYRIDINE HYDROCHLORIDE 100 MG/1
200 TABLET, FILM COATED ORAL 3 TIMES DAILY PRN
Qty: 42 TABLET | Refills: 0 | Status: SHIPPED | OUTPATIENT
Start: 2025-01-30 | End: 2025-02-06

## 2025-01-30 RX ORDER — ONDANSETRON HYDROCHLORIDE 2 MG/ML
INJECTION, SOLUTION INTRAVENOUS AS NEEDED
Status: DISCONTINUED | OUTPATIENT
Start: 2025-01-30 | End: 2025-01-30

## 2025-01-30 RX ORDER — MIDAZOLAM HYDROCHLORIDE 1 MG/ML
1 INJECTION, SOLUTION INTRAMUSCULAR; INTRAVENOUS ONCE AS NEEDED
Status: DISCONTINUED | OUTPATIENT
Start: 2025-01-30 | End: 2025-01-30 | Stop reason: HOSPADM

## 2025-01-30 RX ORDER — NORETHINDRONE AND ETHINYL ESTRADIOL 0.5-0.035
KIT ORAL AS NEEDED
Status: DISCONTINUED | OUTPATIENT
Start: 2025-01-30 | End: 2025-01-30

## 2025-01-30 RX ORDER — HYDRALAZINE HYDROCHLORIDE 20 MG/ML
10 INJECTION INTRAMUSCULAR; INTRAVENOUS EVERY 30 MIN PRN
Status: DISCONTINUED | OUTPATIENT
Start: 2025-01-30 | End: 2025-01-30 | Stop reason: HOSPADM

## 2025-01-30 RX ORDER — TRANEXAMIC ACID 100 MG/ML
INJECTION, SOLUTION INTRAVENOUS AS NEEDED
Status: DISCONTINUED | OUTPATIENT
Start: 2025-01-30 | End: 2025-01-30

## 2025-01-30 RX ORDER — SODIUM CHLORIDE, SODIUM LACTATE, POTASSIUM CHLORIDE, CALCIUM CHLORIDE 600; 310; 30; 20 MG/100ML; MG/100ML; MG/100ML; MG/100ML
INJECTION, SOLUTION INTRAVENOUS CONTINUOUS PRN
Status: DISCONTINUED | OUTPATIENT
Start: 2025-01-30 | End: 2025-01-30

## 2025-01-30 RX ORDER — FAMOTIDINE 20 MG/1
20 TABLET, FILM COATED ORAL ONCE
Status: CANCELLED | OUTPATIENT
Start: 2025-01-30 | End: 2025-01-30

## 2025-01-30 RX ORDER — DIPHENHYDRAMINE HYDROCHLORIDE 50 MG/ML
12.5 INJECTION INTRAMUSCULAR; INTRAVENOUS ONCE AS NEEDED
Status: DISCONTINUED | OUTPATIENT
Start: 2025-01-30 | End: 2025-01-30 | Stop reason: HOSPADM

## 2025-01-30 RX ORDER — KETOROLAC TROMETHAMINE 15 MG/ML
15 INJECTION, SOLUTION INTRAMUSCULAR; INTRAVENOUS ONCE AS NEEDED
Status: DISCONTINUED | OUTPATIENT
Start: 2025-01-30 | End: 2025-01-30 | Stop reason: HOSPADM

## 2025-01-30 RX ORDER — MEPERIDINE HYDROCHLORIDE 25 MG/ML
12.5 INJECTION INTRAMUSCULAR; INTRAVENOUS; SUBCUTANEOUS EVERY 10 MIN PRN
Status: DISCONTINUED | OUTPATIENT
Start: 2025-01-30 | End: 2025-01-30 | Stop reason: HOSPADM

## 2025-01-30 RX ORDER — CEFAZOLIN SODIUM 2 G/100ML
2 INJECTION, SOLUTION INTRAVENOUS ONCE
Status: COMPLETED | OUTPATIENT
Start: 2025-01-30 | End: 2025-01-30

## 2025-01-30 RX ORDER — OXYCODONE AND ACETAMINOPHEN 5; 325 MG/1; MG/1
1 TABLET ORAL
COMMUNITY

## 2025-01-30 RX ORDER — LIDOCAINE HYDROCHLORIDE 10 MG/ML
0.1 INJECTION, SOLUTION EPIDURAL; INFILTRATION; INTRACAUDAL; PERINEURAL ONCE
Status: DISCONTINUED | OUTPATIENT
Start: 2025-01-30 | End: 2025-01-30 | Stop reason: HOSPADM

## 2025-01-30 RX ORDER — GLYCOPYRROLATE 0.2 MG/ML
INJECTION INTRAMUSCULAR; INTRAVENOUS AS NEEDED
Status: DISCONTINUED | OUTPATIENT
Start: 2025-01-30 | End: 2025-01-30

## 2025-01-30 RX ORDER — ALBUTEROL SULFATE 0.83 MG/ML
2.5 SOLUTION RESPIRATORY (INHALATION) ONCE AS NEEDED
Status: DISCONTINUED | OUTPATIENT
Start: 2025-01-30 | End: 2025-01-30 | Stop reason: HOSPADM

## 2025-01-30 RX ORDER — ALBUTEROL SULFATE 0.83 MG/ML
2.5 SOLUTION RESPIRATORY (INHALATION) ONCE
Status: CANCELLED | OUTPATIENT
Start: 2025-01-30 | End: 2025-01-30

## 2025-01-30 RX ORDER — OXYCODONE HCL 10 MG/1
10 TABLET, FILM COATED, EXTENDED RELEASE ORAL ONCE AS NEEDED
Status: DISCONTINUED | OUTPATIENT
Start: 2025-01-30 | End: 2025-01-30 | Stop reason: HOSPADM

## 2025-01-30 RX ORDER — CEPHALEXIN 500 MG/1
500 CAPSULE ORAL 2 TIMES DAILY
Qty: 14 CAPSULE | Refills: 0 | Status: SHIPPED | OUTPATIENT
Start: 2025-01-30 | End: 2025-02-06

## 2025-01-30 RX ORDER — OXYCODONE HYDROCHLORIDE 5 MG/1
5 TABLET ORAL ONCE AS NEEDED
Status: CANCELLED | OUTPATIENT
Start: 2025-01-30

## 2025-01-30 RX ORDER — LIDOCAINE HYDROCHLORIDE 10 MG/ML
INJECTION, SOLUTION INTRAVENOUS AS NEEDED
Status: DISCONTINUED | OUTPATIENT
Start: 2025-01-30 | End: 2025-01-30

## 2025-01-30 RX ORDER — FENTANYL CITRATE 50 UG/ML
INJECTION, SOLUTION INTRAMUSCULAR; INTRAVENOUS AS NEEDED
Status: DISCONTINUED | OUTPATIENT
Start: 2025-01-30 | End: 2025-01-30

## 2025-01-30 RX ORDER — LIDOCAINE HYDROCHLORIDE 20 MG/ML
JELLY TOPICAL AS NEEDED
Status: DISCONTINUED | OUTPATIENT
Start: 2025-01-30 | End: 2025-01-30 | Stop reason: HOSPADM

## 2025-01-30 RX ORDER — LABETALOL HYDROCHLORIDE 5 MG/ML
10 INJECTION, SOLUTION INTRAVENOUS ONCE AS NEEDED
Status: DISCONTINUED | OUTPATIENT
Start: 2025-01-30 | End: 2025-01-30 | Stop reason: HOSPADM

## 2025-01-30 RX ORDER — ONDANSETRON HYDROCHLORIDE 2 MG/ML
4 INJECTION, SOLUTION INTRAVENOUS EVERY 8 HOURS PRN
Status: CANCELLED | OUTPATIENT
Start: 2025-01-30

## 2025-01-30 RX ADMIN — CEFAZOLIN SODIUM 2 G: 2 INJECTION, SOLUTION INTRAVENOUS at 07:13

## 2025-01-30 RX ADMIN — ROCURONIUM BROMIDE 20 MG: 10 INJECTION, SOLUTION INTRAVENOUS at 07:44

## 2025-01-30 RX ADMIN — EPHEDRINE SULFATE 5 MG: 50 INJECTION, SOLUTION INTRAVENOUS at 08:28

## 2025-01-30 RX ADMIN — TRANEXAMIC ACID 1000 MG: 100 INJECTION INTRAVENOUS at 07:54

## 2025-01-30 RX ADMIN — FENTANYL CITRATE 50 MCG: 50 INJECTION INTRAMUSCULAR; INTRAVENOUS at 08:32

## 2025-01-30 RX ADMIN — DEXAMETHASONE SODIUM PHOSPHATE 4 MG: 4 INJECTION, SOLUTION INTRAMUSCULAR; INTRAVENOUS at 07:08

## 2025-01-30 RX ADMIN — ROCURONIUM BROMIDE 10 MG: 10 INJECTION, SOLUTION INTRAVENOUS at 08:19

## 2025-01-30 RX ADMIN — LIDOCAINE HYDROCHLORIDE 40 MG: 10 INJECTION, SOLUTION INTRAVENOUS at 07:08

## 2025-01-30 RX ADMIN — GLYCOPYRROLATE 0.2 MG: 0.2 INJECTION INTRAMUSCULAR; INTRAVENOUS at 07:50

## 2025-01-30 RX ADMIN — SUGAMMADEX 200 MG: 100 INJECTION, SOLUTION INTRAVENOUS at 08:37

## 2025-01-30 RX ADMIN — FENTANYL CITRATE 50 MCG: 50 INJECTION INTRAMUSCULAR; INTRAVENOUS at 07:08

## 2025-01-30 RX ADMIN — ROCURONIUM BROMIDE 50 MG: 10 INJECTION, SOLUTION INTRAVENOUS at 07:08

## 2025-01-30 RX ADMIN — PROPOFOL 200 MG: 10 INJECTION, EMULSION INTRAVENOUS at 07:08

## 2025-01-30 RX ADMIN — ONDANSETRON 4 MG: 2 INJECTION, SOLUTION INTRAMUSCULAR; INTRAVENOUS at 08:32

## 2025-01-30 RX ADMIN — SODIUM CHLORIDE, POTASSIUM CHLORIDE, SODIUM LACTATE AND CALCIUM CHLORIDE: 600; 310; 30; 20 INJECTION, SOLUTION INTRAVENOUS at 07:05

## 2025-01-30 RX ADMIN — GLYCOPYRROLATE 0.2 MG: 0.2 INJECTION INTRAMUSCULAR; INTRAVENOUS at 07:57

## 2025-01-30 RX ADMIN — EPHEDRINE SULFATE 10 MG: 50 INJECTION, SOLUTION INTRAVENOUS at 07:59

## 2025-01-30 SDOH — HEALTH STABILITY: MENTAL HEALTH: CURRENT SMOKER: 0

## 2025-01-30 ASSESSMENT — PAIN - FUNCTIONAL ASSESSMENT
PAIN_FUNCTIONAL_ASSESSMENT: 0-10

## 2025-01-30 ASSESSMENT — PAIN SCALES - GENERAL
PAINLEVEL_OUTOF10: 0 - NO PAIN
PAIN_LEVEL: 2
PAINLEVEL_OUTOF10: 0 - NO PAIN

## 2025-01-30 ASSESSMENT — COLUMBIA-SUICIDE SEVERITY RATING SCALE - C-SSRS
6. HAVE YOU EVER DONE ANYTHING, STARTED TO DO ANYTHING, OR PREPARED TO DO ANYTHING TO END YOUR LIFE?: NO
2. HAVE YOU ACTUALLY HAD ANY THOUGHTS OF KILLING YOURSELF?: NO
1. IN THE PAST MONTH, HAVE YOU WISHED YOU WERE DEAD OR WISHED YOU COULD GO TO SLEEP AND NOT WAKE UP?: NO

## 2025-01-30 NOTE — ANESTHESIA POSTPROCEDURE EVALUATION
"Patient: Brock Chatman    Procedure Summary       Date: 01/30/25 Room / Location: [facility] / [facility]    Anesthesia Start: 0705 Anesthesia Stop: 0850    Procedure: HoLEP ( Morcellator, HoLEP set , P 120 , Holmium ) Diagnosis:       Hypertrophy of prostate with urinary obstruction      (Hypertrophy of prostate with urinary obstruction [N40.1, N13.8])    Surgeons: Vesna Cortez MD Responsible Provider: Jeff Ferris DO    Anesthesia Type: general ASA Status: 3            Anesthesia Type: general  /78   Pulse 64   Temp 36.9 °C (98.4 °F) (Temporal)   Resp 17   Ht 1.778 m (5' 10\")   Wt 87.1 kg (192 lb 0.3 oz)   SpO2 97%   BMI 27.55 kg/m²         Anesthesia Post Evaluation    Patient location during evaluation: bedside  Patient participation: complete - patient participated  Level of consciousness: awake and sleepy but conscious  Pain score: 2  Pain management: adequate  Airway patency: patent  Two or more strategies used to mitigate risk of obstructive sleep apnea  Cardiovascular status: acceptable  Respiratory status: acceptable  Hydration status: acceptable  Postoperative Nausea and Vomiting: none        No notable events documented.    "

## 2025-01-30 NOTE — ANESTHESIA PROCEDURE NOTES
Airway  Date/Time: 1/30/2025 7:11 AM  Urgency: elective    Airway not difficult    Staffing  Performed: CRNA   Authorized by: Jeff Ferris DO    Performed by: KILLIAN Mccarthy-EMILY  Patient location during procedure: OR    Indications and Patient Condition  Indications for airway management: anesthesia  Spontaneous Ventilation: absent  Sedation level: deep  Preoxygenated: yes  Patient position: sniffing  MILS maintained throughout  Mask difficulty assessment: 2 - vent by mask + OA or adjuvant +/- NMBA    Final Airway Details  Final airway type: endotracheal airway      Successful airway: ETT  Cuffed: yes   Successful intubation technique: direct laryngoscopy  Blade: Renan  Blade size: #3  ETT size (mm): 7.5  Cormack-Lehane Classification: grade IIb - view of arytenoids or posterior of glottis only  Placement verified by: chest auscultation and capnometry   Measured from: lips  ETT to lips (cm): 24  Number of attempts at approach: 1

## 2025-01-30 NOTE — DISCHARGE INSTRUCTIONS
Diet  You can eat whatever you like after your surgery. Sometimes the anesthesia can cause nausea, so it may be a good idea to stay away from heavy foods right after you get home from the procedure.    Johnson catheter  You will have a tube in the bladder called a johnson catheter. This drains urine from the bladder and exits the penis. Be sure the catheter is well secured to the leg at all times. This catheter typically stays in from one day to a week (7 days) depending on your circumstances. There should never be any tension or tugging on the catheter. Take care not to pull on the catheter when rolling in bed or changing position. The nurses will show you how to attach the johnson catheter to a leg bag during the day and a big bag at night.    The johnson catheter has a balloon on the end of it to keep it in place in the bladder. This may give you the feeling you need to urinate. Be assured the catheter is draining and the sensation is from the johnson catheter balloon. You may also notice urine or blood-tinged urine leaking around the catheter out the tip of the penis. Typically, this due to a bladder spasm and is not a cause for alarm. If the catheter stops draining, get up and walk around. If it is still not draining, call the office or come to the emergency room as it may be clogged and need to be irrigated. Once the johnson catheter is removed, it is normal to have burning and stinging with urination for a few weeks after surgery. It is also common to have more frequent urination and a greater sense of the urge to urinate. There may not be much warning from the time you feel the urge to urinate to the time when the bladder is ready to empty.    Activity  It is very important to walk after your procedure. Walking prevents blood clots in the legs or lungs. You may go up and down stairs. Avoid any strenuous activity or lifting more than ten pounds for 3 to 4 weeks. This includes any heavy lifting, running, riding a bicycle  or golf. This also includes activities such as raking leaves, mowing the lawn, shoveling snow or other strenuous chores. If you see blood in the urine, increase the amount of water you are drinking and avoid strenuous activity or heavy lifting until the blood clears.    Medications  Take the medications prescribed at the time of your discharge from the hospital. If you are taking any medications on a regular basis prior to your admission to the hospital, you should continue to take those as well. For any aches, pains or headaches, you may use Tylenol or Extra-Strength Tylenol. Sometimes, you will be given a prescription for other pain killers. Do not use any aspirin or aspirin-like compounds or ibuprofen products (NSAIDs) (eg. Advil, Nuprin, Motrin, Bufferin, etc.) for four weeks after surgery. If you start aspirin or aspirin like compounds and you notice blood in your urine, please stop taking it and increase your water intake. Pyridium will be called in that will help with burning.  It will color your urine orange.  Antibiotic will be also prescribed for 1 week.    Avoid constipation  Anesthesia, surgery and narcotic pain medication all increase your risk for constipation. Do not strain to move the bowels as this can impair the healing process and start bleeding. Take plenty of fiber, water and over the counter stool softener to avoid constipation. Stool softener can be taken by mouth twice a day to avoid constipation. A stool softener or laxative is available at any drug store without a prescription (senna or Senokot or SennaGen, Dulcolax or bisacodyl, Miralax, Metamucil, Milk of Magnesia or magnesium hydroxide). Decrease or hold the stool softener for diarrhea or loose stools.    Follow up plan  If you develop a fever greater than 101 degrees Fahrenheit, the catheter stops draining or you are unable to urinate, call the office or come to the emergency room.  Your catheter removal has been scheduled  Please call  [phone] and follow prompts for Dr. Cortez's  if you are not sure of your appointment time or location

## 2025-01-30 NOTE — OP NOTE
HoLEP ( Morcellator, HoLEP set , P 120 , Holmium ) Operative Note     Date: 2025  OR Location: [facility]    Name: Brock Chtaman, : 1955, Age: 69 y.o., MRN: [id number], Sex: male    Diagnosis  Pre-op Diagnosis      * Hypertrophy of prostate with urinary obstruction [N40.1, N13.8] Post-op Diagnosis     * Hypertrophy of prostate with urinary obstruction [N40.1, N13.8]     Procedures  HoLEP ( Morcellator, HoLEP set , P 120 , Holmium )  37102 - AL LASER ENUCLEATION PROSTATE W/MORCELLATION      Surgeons      * Vesna Cortez - Primary    Resident/Fellow/Other Assistant:  Surgeons and Role:     * Oliver Stahl MD - Resident - Assisting    Staff:   Danielleulator: Kari Mcneil Person: Vida    Anesthesia Staff: Anesthesiologist: Jeff Ferris DO  CRNA: KILLIAN Mccarthy-CRNA    Procedure Summary  Anesthesia: General  ASA: III  Estimated Blood Loss: 5mL  Intra-op Medications:   Administrations occurring from 0700 to 0925 on 25:   Medication Name Total Dose   lidocaine 2 % mucosal jelly (Uro-Jet) 1 Application   ceFAZolin (Ancef) 2 g in dextrose (iso)  mL 2 g   dexAMETHasone (Decadron) injection 4 mg/mL 4 mg   ePHEDrine injection 15 mg   fentaNYL (Sublimaze) injection 50 mcg/mL 100 mcg   glycopyrrolate (Robinul) injection 0.2 mg/mL 0.4 mg   LR infusion Cannot be calculated   lidocaine PF (cardiac) syringe 1% 40 mg   ondansetron (Zofran) 2 mg/mL injection 4 mg   propofol (Diprivan) injection 10 mg/mL 200 mg   rocuronium (ZeMuron) 50 mg/5 mL injection 80 mg   sugammadex (Bridion) 200 mg/2 mL injection 200 mg   tranexamic acid (Cyklokapron) injection 1,000 mg              Anesthesia Record               Intraprocedure I/O Totals          Intake    Tranexamic Acid 0.00 mL    The total shown is the total volume documented since Anesthesia Start was filed.    Total Intake 0 mL          Specimen:   ID Type Source Tests Collected by Time   1 : PROSTATE TISSUE Tissue PROSTATE HOLEP SURGICAL  PATHOLOGY EXAM Vesna Cortez MD 1/30/2025 0733                 Drains and/or Catheters:   Urethral Catheter Latex;Coude 22 Fr. (Active)       Tourniquet Times:         Implants:     Findings: BPH    Indications: Brock Chatman is an 69 y.o. male who is having surgery for Hypertrophy of prostate with urinary obstruction [N40.1, N13.8].     The patient was seen in the preoperative area. The risks, benefits, complications, treatment options, non-operative alternatives, expected recovery and outcomes were discussed with the patient. The possibilities of reaction to medication, pulmonary aspiration, injury to surrounding structures, bleeding, recurrent infection, the need for additional procedures, failure to diagnose a condition, and creating a complication requiring transfusion or operation were discussed with the patient. The patient concurred with the proposed plan, giving informed consent.  The site of surgery was properly noted/marked if necessary per policy. The patient has been actively warmed in preoperative area. Preoperative antibiotics have been ordered and given within 1 hours of incision. Venous thrombosis prophylaxis have been ordered including bilateral sequential compression devices    Procedure Details: Preoperative diagnosis: BPH with LUTs    Postoperative diagnosis: same    Procedure: Holmium laser enucleation of prostate and tissue morcellation    Anesthesia: general    IVF: see anesthesia report    EBL: 5 ml    Complications: none    Catheters: 22 Fr 3-way Goss catheter    Specimen: prostate chips    Disposition: PACU in stable condition       Enucleation time: 15  Total laser time: 33  Total energy used: 145,530       Patient is a 69 year-old male with significant obstructive and irritative voiding symptoms not responsive to maximal medical therapy.  Ambulatory evaluation demonstrated him to be a good candidate for HoLEP procedure.  Risks and alternatives were discussed in great detail, he singed  the informed consent and agreed to proceed    50 ml of lubricant were injected to the urethra.  Urethral meatus was dilated with repeat sounds to 30 Fr caliber    A 26 Syrian Ibrahim resectoscope was inserted.  The anterior urethra was normal, there was good coaptation of the membranous urethra, there was a large obstructing prostate.  The bladder was normal without stones or lesions.  Both ureteral orifices were identified.    We started the enucleation using a 550 µm holmium laser fiber.    A circumferential incision was made in the urethra proximal to the sphincter.  It was deepened anteriorly and laterally.  We then entered the space between the capsule and the adenoma bluntly lateral to the verumontanum.  This was done bilaterally.  The posterior plane was developed bilaterally and connected by cutting the fibers proximal to the verumontanum.  The resection was carried as far proximally as possible.    We then returned to the initial incision in the urethra and detached the lateral attachments between the sphincter and the adenoma.  We were able to identify the lateral plane and developed it as proximally as possible by connecting it to the posterior plane.    We then turned to free the anterior portion of the sphincter.  The attachments between the sphincter and the anterior adenoma were taken down with the laser fiber until we met the anterior plan.  We then connected all planes circumferentially.  We continued the enucleation circumferentially until we reached the bladder neck.  The bladder neck was entered anteriorly and good hemostasis was obtained.  Then the bladder neck incision was developed circumferentially around the adenoma.  Adenoma was then rolled into the bladder and residual posterior attachments were removed.    Hemostasis was performed.The laser bridge was removed and the nephroscope loaded with the Piranha morcellator was inserted.  2 irrigations were connected to distended bladder.  Tissue was  completely morcellated.    The laser bridge was inserted again and meticulous hemostasis was performed.  I verified that there was no residual tissue in the bladder, and that ureteral orifices were free.    The laser apparatus was removed and a 22 Arabic three-way catheter was inserted and connected to irrigation.    Patient was extubated and moved to the postoperative area in stable condition.         Disposition: patient will have a trial of void on postoperative day one    Complications:  None; patient tolerated the procedure well.    Disposition: PACU - hemodynamically stable.  Condition: stable                 Additional Details:     Attending Attestation: I was present and scrubbed for the entire procedure.    Vesna Cortez  Phone Number: [phone]

## 2025-01-30 NOTE — ANESTHESIA PREPROCEDURE EVALUATION
Patient: Brock Chatman    Procedure Information       Date/Time: 01/30/25 0700    Procedure: HoLEP ( Morcellator, HoLEP set , P 120 , Holmium )    Location: [facility] / [facility]    Surgeons: Vesna Cortez MD            Relevant Problems   Cardiac   (+) Atherosclerotic heart disease of native coronary artery without angina pectoris   (+) Benign essential hypertension   (+) Hyperlipidemia LDL goal <100   (+) Sinus bradycardia      /Renal   (+) Hypertrophy of prostate with urinary obstruction      Musculoskeletal   (+) Chronic right-sided low back pain without sciatica   (+) Post-traumatic osteoarthritis of right hand   (+) Primary osteoarthritis of left knee       Clinical information reviewed:   Tobacco  Allergies  Meds   Med Hx  Surg Hx   Fam Hx  Soc Hx        NPO Detail:  NPO/Void Status  Carbohydrate Drink Given Prior to Surgery? : N  Date of Last Liquid: 01/29/25  Time of Last Liquid: 2100  Date of Last Solid: 01/29/25  Time of Last Solid: 2100  Last Intake Type: Clear fluids; Food  Time of Last Void: 0430         PHYSICAL EXAM    Anesthesia Plan    History of general anesthesia?: yes  History of complications of general anesthesia?: no    ASA 3     general   (Labs acceptable, EKG sr nl, GETA 2022 not diff)  The patient is not a current smoker.    intravenous induction   Postoperative administration of opioids is intended.  Anesthetic plan and risks discussed with patient.    Plan discussed with CRNA, attending and CAA.

## 2025-01-31 ENCOUNTER — APPOINTMENT (OUTPATIENT)
Dept: UROLOGY | Facility: CLINIC | Age: 70
End: 2025-01-31
Payer: MEDICARE

## 2025-01-31 VITALS — TEMPERATURE: 97.4 F

## 2025-01-31 DIAGNOSIS — N13.8 HYPERTROPHY OF PROSTATE WITH URINARY OBSTRUCTION: Primary | ICD-10-CM

## 2025-01-31 DIAGNOSIS — N40.1 HYPERTROPHY OF PROSTATE WITH URINARY OBSTRUCTION: Primary | ICD-10-CM

## 2025-01-31 PROCEDURE — 1159F MED LIST DOCD IN RCRD: CPT | Performed by: NURSE PRACTITIONER

## 2025-01-31 PROCEDURE — 1036F TOBACCO NON-USER: CPT | Performed by: NURSE PRACTITIONER

## 2025-01-31 PROCEDURE — 51700 IRRIGATION OF BLADDER: CPT | Performed by: NURSE PRACTITIONER

## 2025-01-31 PROCEDURE — 1123F ACP DISCUSS/DSCN MKR DOCD: CPT | Performed by: NURSE PRACTITIONER

## 2025-01-31 PROCEDURE — 99024 POSTOP FOLLOW-UP VISIT: CPT | Performed by: NURSE PRACTITIONER

## 2025-01-31 PROCEDURE — 1160F RVW MEDS BY RX/DR IN RCRD: CPT | Performed by: NURSE PRACTITIONER

## 2025-01-31 NOTE — PROGRESS NOTES
[facility]  Outpatient Clinic Note    Subjective   Brock Chatman is a 69 y.o. male    History of Present Illness   Patient presenting to clinic today for TOV s/p HoLEP with Dr. Cortez 1/30/2025  History of BPH with LUTS. He has been doing well since surgery. Urine has become clear, blood-tinged, no clots. He denies any fevers or chills.    Past Medical History and Surgical History   Past Medical History:   Diagnosis Date    Allergic contact dermatitis due to plants, except food 09/10/2020    Poison ivy dermatitis    Cellulitis of left lower limb 09/10/2020    Cellulitis of left lower extremity     Past Surgical History:   Procedure Laterality Date    COLONOSCOPY  08/28/2014    Complete Colonoscopy    HEMORRHOID SURGERY  08/27/2014    Hemorrhoidectomy    HERNIA REPAIR  08/28/2014    Hernia Repair    OTHER SURGICAL HISTORY  08/28/2014    Wrist Surgery    ROTATOR CUFF REPAIR  08/28/2014    Rotator Cuff Repair    TONSILLECTOMY  08/27/2014    Tonsillectomy       Medications  Current Outpatient Medications on File Prior to Visit   Medication Sig Dispense Refill    cephalexin (Keflex) 500 mg capsule Take 1 capsule (500 mg) by mouth 2 times a day for 7 days. 14 capsule 0    naproxen (Naprosyn) 250 mg tablet Take 1 tablet (250 mg) by mouth 2 times daily (morning and late afternoon).      oxyCODONE-acetaminophen (Percocet) 5-325 mg tablet Take 1 tablet by mouth.      phenazopyridine (Pyridium) 100 mg tablet Take 2 tablets (200 mg) by mouth 3 times a day as needed for bladder spasms for up to 7 days. 42 tablet 0    rosuvastatin (Crestor) 40 mg tablet Take 1 tablet (40 mg) by mouth once daily. 90 tablet 1    tamsulosin (Flomax) 0.4 mg 24 hr capsule TAKE 2 CAPSULES BY MOUTH EVERY  capsule 1    [DISCONTINUED] sulfamethoxazole-trimethoprim (Bactrim DS) 800-160 mg tablet Take 1 tablet by mouth 2 times a day for 7 days. 14 tablet 0     Current Facility-Administered Medications on File Prior to Visit   Medication  Dose Route Frequency Provider Last Rate Last Admin    [DISCONTINUED] albuterol 2.5 mg /3 mL (0.083 %) nebulizer solution 2.5 mg  2.5 mg nebulization Once PRN Jeff Ferris, DO        [DISCONTINUED] diphenhydrAMINE (BENADryl) injection 12.5 mg  12.5 mg intravenous Once PRN Jeff Ferris, DO        [DISCONTINUED] ePHEDrine injection 50 mg  50 mg intramuscular Once Jeff Ferris, DO        [DISCONTINUED] glycopyrrolate (Robinul) 200 mcg/mL injection 0.2 mg  0.2 mg intravenous Once Jeff Ferris, DO        [DISCONTINUED] hydrALAZINE (Apresoline) injection 10 mg  10 mg intravenous q30 min PRN Jeff Ferris, DO        [DISCONTINUED] HYDROmorphone (Dilaudid) injection 0.5 mg  0.5 mg intravenous q5 min PRN Jeff Ferris, DO        [DISCONTINUED] HYDROmorphone (Dilaudid) injection 0.5 mg  0.5 mg intravenous q5 min PRN Jeff Ferris, DO        [DISCONTINUED] HYDROmorphone PF (Dilaudid) injection 0.1 mg  0.1 mg intravenous q5 min PRN Jeff Ferris, DO        [DISCONTINUED] ketorolac (Toradol) injection 15 mg  15 mg intravenous Once PRN Jeff Ferris, DO        [DISCONTINUED] labetaloL (Normodyne,Trandate) injection 10 mg  10 mg intravenous Once PRN Jeff Ferris, DO        [DISCONTINUED] lidocaine PF (Xylocaine) 10 mg/mL (1 %) injection 1 mg  0.1 mL subcutaneous Once Jeff Ferris, DO        [DISCONTINUED] meperidine PF (Demerol) injection 12.5 mg  12.5 mg intravenous q10 min PRN Jeff Ferris, DO        [DISCONTINUED] midazolam (Versed) injection 1 mg  1 mg intravenous Once PRN Jeff Ferris, DO        [DISCONTINUED] ondansetron (Zofran) injection 4 mg  4 mg intravenous Once PRN Jeff Ferris, DO        [DISCONTINUED] oxyCODONE ER (OxyCONTIN) 12 hr tablet 10 mg  10 mg oral Once PRN Jeff Ferris, DO        [DISCONTINUED] oxygen (O2) therapy   inhalation Continuous PRN - O2/gases Jeff eFrris, DO        [DISCONTINUED] promethazine (Phenergan) 6.25 mg in sodium chloride 0.9% 50 mL IV  6.25 mg  intravenous Once PRN Jeff eFrris, DO           Objective   Physicial Exam  General: Well developed, well nourished, alert and cooperative, appears in no acute distress  Eyes: Non-injected conjunctiva, sclera clear, no proptosis  Cardiac: Extremities are warm and well perfused. No edema, cyanosis or pallor.   Lungs: Breathing is easy, non-labored. Speaking in clear and complete sentences. Normal diaphragmatic movement.  MSK: Ambulatory with steady gait, unassisted  Neuro: alert and oriented to person, place and time  Psych: Demonstrates good judgement and reason, without hallucinations, abnormal affect or abnormal behaviors.  Skin: no obvious lesions, no rashes.    Lab on 01/23/2025   Component Date Value Ref Range Status    Glucose 01/23/2025 72 (L)  74 - 99 mg/dL Final    Sodium 01/23/2025 141  136 - 145 mmol/L Final    Potassium 01/23/2025 3.8  3.5 - 5.3 mmol/L Final    Chloride 01/23/2025 105  98 - 107 mmol/L Final    Bicarbonate 01/23/2025 28  21 - 32 mmol/L Final    Anion Gap 01/23/2025 12  10 - 20 mmol/L Final    Urea Nitrogen 01/23/2025 20  6 - 23 mg/dL Final    Creatinine 01/23/2025 0.91  0.50 - 1.30 mg/dL Final    eGFR 01/23/2025 >90  >60 mL/min/1.73m*2 Final    Calculations of estimated GFR are performed using the 2021 CKD-EPI Study Refit equation without the race variable for the IDMS-Traceable creatinine methods.  https://jasn.asnjournals.org/content/early/2021/09/22/ASN.4611052484    Calcium 01/23/2025 9.2  8.6 - 10.6 mg/dL Final    WBC 01/23/2025 4.8  4.4 - 11.3 x10*3/uL Final    nRBC 01/23/2025 0.0  0.0 - 0.0 /100 WBCs Final    RBC 01/23/2025 4.63  4.50 - 5.90 x10*6/uL Final    Hemoglobin 01/23/2025 14.0  13.5 - 17.5 g/dL Final    Hematocrit 01/23/2025 43.1  41.0 - 52.0 % Final    MCV 01/23/2025 93  80 - 100 fL Final    MCH 01/23/2025 30.2  26.0 - 34.0 pg Final    MCHC 01/23/2025 32.5  32.0 - 36.0 g/dL Final    RDW 01/23/2025 12.3  11.5 - 14.5 % Final    Platelets 01/23/2025 207  150 - 450  x10*3/uL Final    Protime 01/23/2025 12.1  9.8 - 12.8 seconds Final    INR 01/23/2025 1.1  0.9 - 1.1 Final    Urine Culture 01/23/2025 No growth   Final   Pre-Admission Testing on 01/16/2025   Component Date Value Ref Range Status    Ventricular Rate 01/16/2025 60  BPM Final    Atrial Rate 01/16/2025 60  BPM Final    [place] Interval 01/16/2025 164  ms Final    QRS Duration 01/16/2025 96  ms Final    QT Interval 01/16/2025 410  ms Final    QTC Calculation(Bazett) 01/16/2025 410  ms Final    P Axis 01/16/2025 51  degrees Final    R Axis 01/16/2025 80  degrees Final    T Axis 01/16/2025 58  degrees Final    QRS Count 01/16/2025 10  beats Final    Q Onset 01/16/2025 218  ms Final    P Onset 01/16/2025 136  ms Final    P Offset 01/16/2025 196  ms Final    T Offset 01/16/2025 423  ms Final    QTC Fredericia 01/16/2025 410  ms Final      Review of Systems  All other systems have been reviewed and are negative for complaint.      Assessment and Plan   We discussed postoperative urinary retention in great detail. We discussed that different medications, including anesthesia can influence urinary retention. We discussed that postoperative constipation can also contribute to urinary retention. We discussed management of urinary retention to include indwelling catheter or CIC. We discussed risks of unmanaged urinary retention including irreversible kidney injury and increased risk of UTI. We discussed TOV today.    [] TOV today passed. 250 ml sterile water instilled, 260 ml clear, blood tinged urine output. No clots   [] Drink plenty of fluids to maintain hydration and clear urine output  [] You may have some irritative voiding symptoms over the next few days: burning, frequency, urgency  [] Activity restrictions reviewed    He will return to clinic in 3 months for post-operative visit with Dr. Cortez, or sooner if needed.    All questions and concerns were addressed. Patient verbalizes understanding and has no other questions at  this time.     Sarah Beth Nash-- KILLIAN, CNP  Office Phone:  [phone]

## 2025-02-03 ASSESSMENT — PAIN SCALES - GENERAL: PAINLEVEL_OUTOF10: 0 - NO PAIN

## 2025-02-05 LAB
LABORATORY COMMENT REPORT: NORMAL
PATH REPORT.FINAL DX SPEC: NORMAL
PATH REPORT.GROSS SPEC: NORMAL
PATH REPORT.RELEVANT HX SPEC: NORMAL
PATH REPORT.TOTAL CANCER: NORMAL

## 2025-03-08 DIAGNOSIS — E78.5 HYPERLIPIDEMIA LDL GOAL <100: ICD-10-CM

## 2025-03-10 RX ORDER — ROSUVASTATIN CALCIUM 40 MG/1
40 TABLET, COATED ORAL DAILY
Qty: 90 TABLET | Refills: 1 | OUTPATIENT
Start: 2025-03-10

## 2025-03-18 DIAGNOSIS — N40.1 HYPERTROPHY OF PROSTATE WITH URINARY OBSTRUCTION: ICD-10-CM

## 2025-03-18 DIAGNOSIS — N13.8 HYPERTROPHY OF PROSTATE WITH URINARY OBSTRUCTION: ICD-10-CM

## 2025-03-18 RX ORDER — TAMSULOSIN HYDROCHLORIDE 0.4 MG/1
0.8 CAPSULE ORAL DAILY
Qty: 180 CAPSULE | Refills: 1 | OUTPATIENT
Start: 2025-03-18

## 2025-05-07 ENCOUNTER — APPOINTMENT (OUTPATIENT)
Dept: UROLOGY | Facility: CLINIC | Age: 70
End: 2025-05-07
Payer: MEDICARE

## (undated) DEVICE — Device

## (undated) DEVICE — BLADE, ROTATION MORCELLATOR, 4.8MM X 335MM, PIRHANA, DISPOSABLE

## (undated) DEVICE — TUBING, ELLIK, FOR ELLIK/TOOMEY ADAPTERS

## (undated) DEVICE — CATHETER, LASER URETERAL, 7.1FR, 40CM

## (undated) DEVICE — SYRINGE, TOOMEY, IRRIGATION, 60ML, INDIVIDUAL WRAP, STERILE

## (undated) DEVICE — 22FR, 30CC, BARDEX LUBRICATH HEMATURIA LATEX FOLEY CATHETER, COUDE TIP, 3-WAY

## (undated) DEVICE — GUIDEWIRE, DUAL SENSOR, .035 X 150 STRAIGHT,  3CM

## (undated) DEVICE — ADAPTER, Y TUBING STERILE

## (undated) DEVICE — TUBING, MORCELLATOR PUMP, DISPOSABLE

## (undated) DEVICE — GLOVE, PROTEXIS PI CLASSIC, SZ-6.5, PF, LF

## (undated) DEVICE — UROVAC BLADDER EVACUATOR

## (undated) DEVICE — IRRIGATION SET, CYSTOSCOPY, TURP, Y, CONTINUOUS, 81 IN

## (undated) DEVICE — SYRINGE, 20 CC, LUER LOCK

## (undated) DEVICE — GOWN, SURGICAL, SIRUS, NON REINFORCED, LARGE